# Patient Record
Sex: FEMALE | Race: WHITE | NOT HISPANIC OR LATINO | Employment: FULL TIME | URBAN - METROPOLITAN AREA
[De-identification: names, ages, dates, MRNs, and addresses within clinical notes are randomized per-mention and may not be internally consistent; named-entity substitution may affect disease eponyms.]

---

## 2020-06-22 DIAGNOSIS — Z12.31 SCREENING MAMMOGRAM, ENCOUNTER FOR: Primary | ICD-10-CM

## 2020-06-22 DIAGNOSIS — M54.16 LUMBAR RADICULOPATHY: Primary | ICD-10-CM

## 2020-07-10 ENCOUNTER — HOSPITAL ENCOUNTER (OUTPATIENT)
Dept: RADIOLOGY | Facility: HOSPITAL | Age: 60
Discharge: HOME | End: 2020-07-10
Attending: NURSE PRACTITIONER
Payer: OTHER GOVERNMENT

## 2020-07-10 DIAGNOSIS — M54.16 LUMBAR RADICULOPATHY: ICD-10-CM

## 2020-07-10 PROCEDURE — 72148 MRI LUMBAR SPINE WITHOUT CONTRAST: ICD-10-PCS | Mod: 26,,, | Performed by: RADIOLOGY

## 2020-07-10 PROCEDURE — 72148 MRI LUMBAR SPINE W/O DYE: CPT | Mod: 26,,, | Performed by: RADIOLOGY

## 2020-07-10 PROCEDURE — 72148 MRI LUMBAR SPINE W/O DYE: CPT | Mod: TC

## 2020-08-04 ENCOUNTER — OFFICE VISIT (OUTPATIENT)
Dept: PAIN MEDICINE | Facility: CLINIC | Age: 60
End: 2020-08-04
Payer: OTHER GOVERNMENT

## 2020-08-04 VITALS
DIASTOLIC BLOOD PRESSURE: 76 MMHG | WEIGHT: 172 LBS | HEART RATE: 115 BPM | HEIGHT: 65 IN | SYSTOLIC BLOOD PRESSURE: 140 MMHG | BODY MASS INDEX: 28.66 KG/M2

## 2020-08-04 DIAGNOSIS — M70.62 GREATER TROCHANTERIC BURSITIS OF LEFT HIP: Primary | ICD-10-CM

## 2020-08-04 DIAGNOSIS — M54.16 RADICULOPATHY, LUMBAR REGION: ICD-10-CM

## 2020-08-04 DIAGNOSIS — Z01.818 PRE-OP TESTING: Primary | ICD-10-CM

## 2020-08-04 DIAGNOSIS — M51.36 DDD (DEGENERATIVE DISC DISEASE), LUMBAR: ICD-10-CM

## 2020-08-04 DIAGNOSIS — M47.896 OTHER SPONDYLOSIS, LUMBAR REGION: ICD-10-CM

## 2020-08-04 PROCEDURE — 99204 PR OFFICE/OUTPT VISIT, NEW, LEVL IV, 45-59 MIN: ICD-10-PCS | Mod: 25,S$PBB,, | Performed by: ANESTHESIOLOGY

## 2020-08-04 PROCEDURE — 20610 LARGE JOINT ASPIRATION/INJECTION: L GREATER TROCHANTERIC BURSA: ICD-10-PCS | Mod: S$PBB,LT,, | Performed by: ANESTHESIOLOGY

## 2020-08-04 PROCEDURE — 99213 OFFICE O/P EST LOW 20 MIN: CPT | Mod: PBBFAC,PN,25 | Performed by: ANESTHESIOLOGY

## 2020-08-04 PROCEDURE — 99999 PR PBB SHADOW E&M-EST. PATIENT-LVL III: CPT | Mod: PBBFAC,,, | Performed by: ANESTHESIOLOGY

## 2020-08-04 PROCEDURE — 20610 DRAIN/INJ JOINT/BURSA W/O US: CPT | Mod: PBBFAC,PN | Performed by: ANESTHESIOLOGY

## 2020-08-04 PROCEDURE — 99999 PR PBB SHADOW E&M-EST. PATIENT-LVL III: ICD-10-PCS | Mod: PBBFAC,,, | Performed by: ANESTHESIOLOGY

## 2020-08-04 PROCEDURE — 99204 OFFICE O/P NEW MOD 45 MIN: CPT | Mod: 25,S$PBB,, | Performed by: ANESTHESIOLOGY

## 2020-08-04 RX ORDER — METHYLPREDNISOLONE ACETATE 40 MG/ML
40 INJECTION, SUSPENSION INTRA-ARTICULAR; INTRALESIONAL; INTRAMUSCULAR; SOFT TISSUE
Status: DISCONTINUED | OUTPATIENT
Start: 2020-08-04 | End: 2020-08-04 | Stop reason: HOSPADM

## 2020-08-04 RX ORDER — AMITRIPTYLINE HYDROCHLORIDE 25 MG/1
TABLET, FILM COATED ORAL
COMMUNITY
Start: 2020-06-16 | End: 2020-08-11

## 2020-08-04 RX ORDER — PREDNISONE 10 MG/1
TABLET ORAL
COMMUNITY
Start: 2020-06-16 | End: 2021-03-22

## 2020-08-04 RX ORDER — CETIRIZINE HYDROCHLORIDE AND PSEUDOEPHEDRINE HYDROCHLORIDE 5; 120 MG/1; MG/1
1 TABLET, FILM COATED, EXTENDED RELEASE ORAL EVERY 12 HOURS PRN
COMMUNITY
Start: 2020-06-16

## 2020-08-04 RX ORDER — DICLOFENAC SODIUM 75 MG/1
75 TABLET, DELAYED RELEASE ORAL 2 TIMES DAILY
Qty: 60 TABLET | Refills: 2 | Status: SHIPPED | OUTPATIENT
Start: 2020-08-04 | End: 2021-03-22

## 2020-08-04 RX ADMIN — METHYLPREDNISOLONE ACETATE 40 MG: 40 INJECTION, SUSPENSION INTRA-ARTICULAR; INTRALESIONAL; INTRAMUSCULAR; SOFT TISSUE at 02:08

## 2020-08-04 NOTE — PROCEDURES
Large Joint Aspiration/Injection: L greater trochanteric bursa    Date/Time: 8/4/2020 2:00 PM  Performed by: Luan Russell MD  Authorized by: Luan Russell MD     Consent Done?:  Yes (Written)  Indications:  Pain  Site marked: the procedure site was marked    Timeout: prior to procedure the correct patient, procedure, and site was verified    Prep: patient was prepped and draped in usual sterile fashion      Local anesthesia used?: Yes (cold spray used for topicalization)    Local anesthetic:  Bupivacaine 0.25% without epinephrine    Details:  Needle Size:  25 G  Ultrasonic Guidance for needle placement?: No    Approach:  Lateral  Location:  Hip  Site:  L greater trochanteric bursa  Medications:  40 mg methylPREDNISolone acetate 40 mg/mL  Patient tolerance:  Patient tolerated the procedure well with no immediate complications     40 mg of depo-medrol was diluted with 4 mL of 0.25% bupivacaine

## 2020-08-04 NOTE — H&P (VIEW-ONLY)
Referring Physician: Felipe Wolf    PCP: Tanner Denson NP    CC: low back pain and left hip pain    HPI:   Michelle Miller is a 60 y.o. female with PMH significant for COPD and active tobacco use presents as a referral for the evaluation of low back and left hip pain. The patient reports that her pain began approximately in 1/2018 after no inciting incident or trauma. The patient localizes her pain at the level of her bra strap and lower (L>R). The patient reports of intermittent left hip pain. The patient also reports that her pain can radiate down her LLE to her ankle when she sits for prolonged periods of time. The patient reports of intermittent numbness and tingling in her LLE when she drives for prolonged periods of time. The patient describes her pain as a sharp type of pain. The patient reports that her current pain 3-4/10. Patient denies of any urinary/fecal incontinence, saddle anesthesia, or weakness.     Of note, the patient does report of left sided hip pain which was not present when she was being treated by Dr. Mares.     Aggravating factors: walking, laying on her left side, prolonged sitting, bending    Mitigating factors: loratab as needed (but requires phenergan for nausea)     Relevant Surgeries: no    Interventional Therapies: yes; Dr. Mares  12/21/2018: Left L4-L5 transforaminal HILARIO - 1 year of benefit     : Not applicable      Non-pharmacologic Treatment:     · Physical Therapy: yes; last did in 2017; no benefit   · Ice/Heat: yes; alternates heat and ice without much benefit   · TENS: no  · Massage: yes; tried at the chiropractor with no benefit   · Chiropractic care: yes; no benefit   · Acupuncture: no         Pain Medications:         · Currently taking: Loratab  mg (half a tablet) PO q day; ibuprofen (some benefit)    · Has tried in the past:    · Opioids: yes  · NSAIDS: yes; meloxicam - could not tolerate side effects  · Tylenol: yes; some benefit   · Muscle relaxants: yes;  cannot tolerate side effects   · TCAs: yes; stopped elavil secondary to no benefit   · SNRIs: no  · Anticonvulsants: no  · topical creams: yes; OTC creams with no benefit   · Other: prednisone PRN is helpful for back pain    Anticoagulation: no    ROS:  Review of Systems   Constitutional: Negative for chills and fever.   HENT: Negative for sore throat.    Eyes: Negative for visual disturbance.   Respiratory: Negative for shortness of breath.    Cardiovascular: Negative for chest pain.   Gastrointestinal: Negative for nausea and vomiting.   Genitourinary: Negative for difficulty urinating.   Musculoskeletal: Positive for arthralgias and back pain.   Skin: Negative for rash.   Allergic/Immunologic: Negative for immunocompromised state.   Neurological: Negative for syncope.   Hematological: Does not bruise/bleed easily.   Psychiatric/Behavioral: Negative for suicidal ideas.        Past Medical History:   Diagnosis Date    Arthritis     Asthma     Osteoporosis      Past Surgical History:   Procedure Laterality Date    CHOLECYSTECTOMY      HILARIO  12/21/2018    FRACTURE SURGERY      HYSTERECTOMY       History reviewed. No pertinent family history.  Social History     Socioeconomic History    Marital status: Single     Spouse name: Not on file    Number of children: Not on file    Years of education: Not on file    Highest education level: Not on file   Occupational History    Not on file   Social Needs    Financial resource strain: Not on file    Food insecurity     Worry: Not on file     Inability: Not on file    Transportation needs     Medical: Not on file     Non-medical: Not on file   Tobacco Use    Smoking status: Current Every Day Smoker    Smokeless tobacco: Current User   Substance and Sexual Activity    Alcohol use: Yes     Frequency: Monthly or less     Drinks per session: 1 or 2     Binge frequency: Less than monthly    Drug use: Never    Sexual activity: Not Currently   Lifestyle     "Physical activity     Days per week: Not on file     Minutes per session: Not on file    Stress: Not on file   Relationships    Social connections     Talks on phone: Not on file     Gets together: Not on file     Attends Restoration[employer] service: Not on file     Active member of club or organization: Not on file     Attends meetings of clubs or organizations: Not on file     Relationship status: Not on file   Other Topics Concern    Not on file   Social History Narrative    Not on file         Allergies: See med card    Vitals:    08/04/20 1322   BP: (!) 140/76   Pulse: (!) 115   Weight: 78 kg (172 lb)   Height: 5' 5" (1.651 m)   PainSc:   2   PainLoc: Back         Physical exam:  Physical Exam   Constitutional: She is oriented to person, place, and time and well-developed, well-nourished, and in no distress.   HENT:   Head: Normocephalic and atraumatic.   Eyes: Conjunctivae and EOM are normal. Right eye exhibits no discharge. Left eye exhibits no discharge.   Cardiovascular: Normal rate.   Pulmonary/Chest: Effort normal and breath sounds normal. No respiratory distress.   Abdominal: Soft.   Neurological: She is alert and oriented to person, place, and time.   Skin: Skin is warm and dry. No rash noted. She is not diaphoretic.   Psychiatric: Mood, memory, affect and judgment normal.   Nursing note and vitals reviewed.       UPPER EXTREMITIES: Normal alignment, normal range of motion, no atrophy, no skin changes,  hair growth and nail growth normal and equal bilaterally. No swelling, no tenderness.    LOWER EXTREMITIES:  Normal alignment, normal range of motion, no atrophy, no skin changes,  hair growth and nail growth normal and equal bilaterally. No swelling, no tenderness.    LUMBAR SPINE  Lumbar spine: ROM is limited with flexion extension and oblique extension with increased pain with extension.     ((--)) Supine straight leg raise    ((+)) Facet loading   Internal and external rotation of the hip causes no " increased pain on either side. TTP at the site of the left greater trochanter.   Myofascial exam: Tenderness to palpation across lumbar paraspinous muscles.    ((--)) TTP at the SI joint  ((--)) ARGELIA's test (positive for hip pain bilaterally)  ((--)) One leg stand    ((--)) Distraction test    CRANIAL NERVES:  II:  PERRL bilaterally,   III,IV,VI: EOMI.    V:  Facial sensation equal bilaterally  VII:  Facial motor function normal.  VIII:  Hearing equal to finger rub bilaterally  IX/X: Gag normal, palate symmetric  XI:  Shoulder shrug equal, head turn equal  XII:  Tongue midline without fasciculations      MOTOR: Tone and bulk: normal bilateral upper and lower Strength: normal   Delt Bi Tri WE WF     R 5 5 5 5 5 5   L 5 5 5 5 5 5     IP ADD ABD Quad TA Gas HAM  R 5 5 5 5 5 5 5  L 5 5 5 5 5 5 5    SENSATION: Light touch and pinprick intact bilaterally  REFLEXES: normal, symmetric, nonbrisk.  Toes down, no clonus. Negative zarate's sign bilaterally.  GAIT: normal rise, base, steps, and arm swing.        Imaging:          MRI Lumbar spine without contrast (7/10/2020):  Vertebral body height and alignment are anatomic.  Marrow signal is within normal limits.  The conus terminates at L1.  Disc heights are well maintained.  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     L1-2: There is no disc herniation.  There is mild bilateral neuroforaminal narrowing predominately due to short pedicles.     L2-3: There is no disc herniation.  Mild spinal canal narrowing and mild bilateral neuroforaminal narrowing are present due to short pedicles and mild facet arthropathy.     L3-4: There is moderate bilateral neuroforaminal narrowing due to mild disc bulging, short pedicles, and mild facet arthropathy.  Mild spinal canal narrowing is also present.     L4-5: There is moderate bilateral neuroforaminal narrowing related to mild disc bulging, short pedicles, and mild facet arthropathy.  There is also mild spinal canal  narrowing.     L5-S1: No disc herniation, spinal canal narrowing, or neuroforaminal narrowing.     There is a 2.8 cm fusiform aneurysm of the infrarenal aorta.     Impression:     Mild spinal canal narrowing and neuroforaminal narrowing ranging from mild to moderate, with congenitally short pedicles contributory.     Small infrarenal abdominal aortic aneurysm.    Assessment: Michelle Miller is a 60 y.o. female with PMH significant for COPD and active tobacco use presents as a referral for the evaluation of low back and left hip pain. Physical examination significant for reproducible TTP at the site of the left greater trochanter and pain with facet loading and extension of the lumbar spine. MRI reviewed with the patient personally. MRI significant for DDD and facet arthropathy. Treatment plan outlined below.     Plan:  - Schedule for L4-L5 lumbar interlaminar epidural steroid injection approximately 2 weeks from now in [place] for back pain  - Performed left greater trochanteric bursa injection today in clinic   - Prescribe diclofenac 75 mg PO q 12 hr PRN for inflammation and pain. Instructed the patient to discontinue her OTC ibuprofen while taking diclofenac.   - I have stressed the importance of physical activity and a home exercise plan to help with chronic pain and improve health.  - RTC in 2 weeks for the procedure as outlined above     Thank you for referring this interesting patient, and I look forward to continuing to collaborate in her care.    Luan Russell MD  Pain Management

## 2020-08-04 NOTE — PROGRESS NOTES
Referring Physician: Felipe Wolf    PCP: Tanner Denson NP    CC: low back pain and left hip pain    HPI:   Michelle Miller is a 60 y.o. female with PMH significant for COPD and active tobacco use presents as a referral for the evaluation of low back and left hip pain. The patient reports that her pain began approximately in 1/2018 after no inciting incident or trauma. The patient localizes her pain at the level of her bra strap and lower (L>R). The patient reports of intermittent left hip pain. The patient also reports that her pain can radiate down her LLE to her ankle when she sits for prolonged periods of time. The patient reports of intermittent numbness and tingling in her LLE when she drives for prolonged periods of time. The patient describes her pain as a sharp type of pain. The patient reports that her current pain 3-4/10. Patient denies of any urinary/fecal incontinence, saddle anesthesia, or weakness.     Of note, the patient does report of left sided hip pain which was not present when she was being treated by Dr. Mares.     Aggravating factors: walking, laying on her left side, prolonged sitting, bending    Mitigating factors: loratab as needed (but requires phenergan for nausea)     Relevant Surgeries: no    Interventional Therapies: yes; Dr. Mares  12/21/2018: Left L4-L5 transforaminal IHLARIO - 1 year of benefit     : Not applicable      Non-pharmacologic Treatment:     · Physical Therapy: yes; last did in 2017; no benefit   · Ice/Heat: yes; alternates heat and ice without much benefit   · TENS: no  · Massage: yes; tried at the chiropractor with no benefit   · Chiropractic care: yes; no benefit   · Acupuncture: no         Pain Medications:         · Currently taking: Loratab  mg (half a tablet) PO q day; ibuprofen (some benefit)    · Has tried in the past:    · Opioids: yes  · NSAIDS: yes; meloxicam - could not tolerate side effects  · Tylenol: yes; some benefit   · Muscle relaxants: yes;  cannot tolerate side effects   · TCAs: yes; stopped elavil secondary to no benefit   · SNRIs: no  · Anticonvulsants: no  · topical creams: yes; OTC creams with no benefit   · Other: prednisone PRN is helpful for back pain    Anticoagulation: no    ROS:  Review of Systems   Constitutional: Negative for chills and fever.   HENT: Negative for sore throat.    Eyes: Negative for visual disturbance.   Respiratory: Negative for shortness of breath.    Cardiovascular: Negative for chest pain.   Gastrointestinal: Negative for nausea and vomiting.   Genitourinary: Negative for difficulty urinating.   Musculoskeletal: Positive for arthralgias and back pain.   Skin: Negative for rash.   Allergic/Immunologic: Negative for immunocompromised state.   Neurological: Negative for syncope.   Hematological: Does not bruise/bleed easily.   Psychiatric/Behavioral: Negative for suicidal ideas.        Past Medical History:   Diagnosis Date    Arthritis     Asthma     Osteoporosis      Past Surgical History:   Procedure Laterality Date    CHOLECYSTECTOMY      HILARIO  12/21/2018    FRACTURE SURGERY      HYSTERECTOMY       History reviewed. No pertinent family history.  Social History     Socioeconomic History    Marital status: Single     Spouse name: Not on file    Number of children: Not on file    Years of education: Not on file    Highest education level: Not on file   Occupational History    Not on file   Social Needs    Financial resource strain: Not on file    Food insecurity     Worry: Not on file     Inability: Not on file    Transportation needs     Medical: Not on file     Non-medical: Not on file   Tobacco Use    Smoking status: Current Every Day Smoker    Smokeless tobacco: Current User   Substance and Sexual Activity    Alcohol use: Yes     Frequency: Monthly or less     Drinks per session: 1 or 2     Binge frequency: Less than monthly    Drug use: Never    Sexual activity: Not Currently   Lifestyle     "Physical activity     Days per week: Not on file     Minutes per session: Not on file    Stress: Not on file   Relationships    Social connections     Talks on phone: Not on file     Gets together: Not on file     Attends [employer] service: Not on file     Active member of club or organization: Not on file     Attends meetings of clubs or organizations: Not on file     Relationship status: Not on file   Other Topics Concern    Not on file   Social History Narrative    Not on file         Allergies: See med card    Vitals:    08/04/20 1322   BP: (!) 140/76   Pulse: (!) 115   Weight: 78 kg (172 lb)   Height: 5' 5" (1.651 m)   PainSc:   2   PainLoc: Back         Physical exam:  Physical Exam   Constitutional: She is oriented to person, place, and time and well-developed, well-nourished, and in no distress.   HENT:   Head: Normocephalic and atraumatic.   Eyes: Conjunctivae and EOM are normal. Right eye exhibits no discharge. Left eye exhibits no discharge.   Cardiovascular: Normal rate.   Pulmonary/Chest: Effort normal and breath sounds normal. No respiratory distress.   Abdominal: Soft.   Neurological: She is alert and oriented to person, place, and time.   Skin: Skin is warm and dry. No rash noted. She is not diaphoretic.   Psychiatric: Mood, memory, affect and judgment normal.   Nursing note and vitals reviewed.       UPPER EXTREMITIES: Normal alignment, normal range of motion, no atrophy, no skin changes,  hair growth and nail growth normal and equal bilaterally. No swelling, no tenderness.    LOWER EXTREMITIES:  Normal alignment, normal range of motion, no atrophy, no skin changes,  hair growth and nail growth normal and equal bilaterally. No swelling, no tenderness.    LUMBAR SPINE  Lumbar spine: ROM is limited with flexion extension and oblique extension with increased pain with extension.     ((--)) Supine straight leg raise    ((+)) Facet loading   Internal and external rotation of the hip causes no " increased pain on either side. TTP at the site of the left greater trochanter.   Myofascial exam: Tenderness to palpation across lumbar paraspinous muscles.    ((--)) TTP at the SI joint  ((--)) ARGELIA's test (positive for hip pain bilaterally)  ((--)) One leg stand    ((--)) Distraction test    CRANIAL NERVES:  II:  PERRL bilaterally,   III,IV,VI: EOMI.    V:  Facial sensation equal bilaterally  VII:  Facial motor function normal.  VIII:  Hearing equal to finger rub bilaterally  IX/X: Gag normal, palate symmetric  XI:  Shoulder shrug equal, head turn equal  XII:  Tongue midline without fasciculations      MOTOR: Tone and bulk: normal bilateral upper and lower Strength: normal   Delt Bi Tri WE WF     R 5 5 5 5 5 5   L 5 5 5 5 5 5     IP ADD ABD Quad TA Gas HAM  R 5 5 5 5 5 5 5  L 5 5 5 5 5 5 5    SENSATION: Light touch and pinprick intact bilaterally  REFLEXES: normal, symmetric, nonbrisk.  Toes down, no clonus. Negative zarate's sign bilaterally.  GAIT: normal rise, base, steps, and arm swing.        Imaging:          MRI Lumbar spine without contrast (7/10/2020):  Vertebral body height and alignment are anatomic.  Marrow signal is within normal limits.  The conus terminates at L1.  Disc heights are well maintained.  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     L1-2: There is no disc herniation.  There is mild bilateral neuroforaminal narrowing predominately due to short pedicles.     L2-3: There is no disc herniation.  Mild spinal canal narrowing and mild bilateral neuroforaminal narrowing are present due to short pedicles and mild facet arthropathy.     L3-4: There is moderate bilateral neuroforaminal narrowing due to mild disc bulging, short pedicles, and mild facet arthropathy.  Mild spinal canal narrowing is also present.     L4-5: There is moderate bilateral neuroforaminal narrowing related to mild disc bulging, short pedicles, and mild facet arthropathy.  There is also mild spinal canal  narrowing.     L5-S1: No disc herniation, spinal canal narrowing, or neuroforaminal narrowing.     There is a 2.8 cm fusiform aneurysm of the infrarenal aorta.     Impression:     Mild spinal canal narrowing and neuroforaminal narrowing ranging from mild to moderate, with congenitally short pedicles contributory.     Small infrarenal abdominal aortic aneurysm.    Assessment: Michelle Miller is a 60 y.o. female with PMH significant for COPD and active tobacco use presents as a referral for the evaluation of low back and left hip pain. Physical examination significant for reproducible TTP at the site of the left greater trochanter and pain with facet loading and extension of the lumbar spine. MRI reviewed with the patient personally. MRI significant for DDD and facet arthropathy. Treatment plan outlined below.     Plan:  - Schedule for L4-L5 lumbar interlaminar epidural steroid injection approximately 2 weeks from now in [place] for back pain  - Performed left greater trochanteric bursa injection today in clinic   - Prescribe diclofenac 75 mg PO q 12 hr PRN for inflammation and pain. Instructed the patient to discontinue her OTC ibuprofen while taking diclofenac.   - I have stressed the importance of physical activity and a home exercise plan to help with chronic pain and improve health.  - RTC in 2 weeks for the procedure as outlined above     Thank you for referring this interesting patient, and I look forward to continuing to collaborate in her care.    Luan Russell MD  Pain Management

## 2020-08-06 ENCOUNTER — TELEPHONE (OUTPATIENT)
Dept: PAIN MEDICINE | Facility: CLINIC | Age: 60
End: 2020-08-06

## 2020-08-06 NOTE — TELEPHONE ENCOUNTER
----- Message from Rosalva Gleason sent at 8/5/2020  8:53 AM CDT -----  Contact: self  Type: Needs Medical Advice  Who Called:  patient     Best Call Back Number: [phone]  Additional Information: patient has questions regarding her procedure and date please contact to advise.

## 2020-08-06 NOTE — TELEPHONE ENCOUNTER
Left msg on machine   Patient's wife notified [facility] ready to be picked up.  Informed her that it had been received by pharmacy 6-26-19.  She v/[address]

## 2020-08-11 ENCOUNTER — OFFICE VISIT (OUTPATIENT)
Dept: ENDOCRINOLOGY | Facility: CLINIC | Age: 60
End: 2020-08-11
Payer: OTHER GOVERNMENT

## 2020-08-11 VITALS
TEMPERATURE: 98 F | HEIGHT: 65 IN | BODY MASS INDEX: 28.14 KG/M2 | DIASTOLIC BLOOD PRESSURE: 72 MMHG | WEIGHT: 168.88 LBS | HEART RATE: 97 BPM | SYSTOLIC BLOOD PRESSURE: 132 MMHG

## 2020-08-11 DIAGNOSIS — E55.9 VITAMIN D INSUFFICIENCY: ICD-10-CM

## 2020-08-11 DIAGNOSIS — M85.80 OSTEOPENIA, UNSPECIFIED LOCATION: ICD-10-CM

## 2020-08-11 DIAGNOSIS — E05.90 HYPERTHYROIDISM: Primary | ICD-10-CM

## 2020-08-11 PROCEDURE — 99999 PR PBB SHADOW E&M-EST. PATIENT-LVL III: CPT | Mod: PBBFAC,,, | Performed by: INTERNAL MEDICINE

## 2020-08-11 PROCEDURE — 99204 PR OFFICE/OUTPT VISIT, NEW, LEVL IV, 45-59 MIN: ICD-10-PCS | Mod: S$PBB,,, | Performed by: INTERNAL MEDICINE

## 2020-08-11 PROCEDURE — 99204 OFFICE O/P NEW MOD 45 MIN: CPT | Mod: S$PBB,,, | Performed by: INTERNAL MEDICINE

## 2020-08-11 PROCEDURE — 99999 PR PBB SHADOW E&M-EST. PATIENT-LVL III: ICD-10-PCS | Mod: PBBFAC,,, | Performed by: INTERNAL MEDICINE

## 2020-08-11 PROCEDURE — 99213 OFFICE O/P EST LOW 20 MIN: CPT | Mod: PBBFAC,PO | Performed by: INTERNAL MEDICINE

## 2020-08-11 RX ORDER — METHIMAZOLE 10 MG/1
20 TABLET ORAL DAILY
Qty: 60 TABLET | Refills: 5 | Status: SHIPPED | OUTPATIENT
Start: 2020-08-11 | End: 2020-09-22

## 2020-08-11 NOTE — PROGRESS NOTES
Subjective:      Chief Complaint: Hyperthyroidism    HPI: Michelle Miller is a 60 y.o. female who is here for an initial evaluation for thyroid.    For the hyperthyroidism:  6/2020: TSH <0.005    Last labs 7/6/2020:   TSH <0.005   Free T4 2.43 (high)   Total T3 268 (high)    Did have some low TSH in the past, 7/2019 and 4/2019 was too low to measure. Was prescribed levothyroxine based on those results, not taking currently.  Prior 12/2016 and 3/2015 was normal TSH.    Pt also reports having a hysterectomy 2005. Not on hormones.    Also notes hx osteopenia. Toe fracture, no other broken bones. No falls.  Had some low vit D.    Pt reports fatigue, sweats, irritability, hair thinning. Sometimes diarrhea, sometimes constipation. Feeling hot a lot. Tremor, insomnia. Palpitations. Anxiety. Lost a few lbs. No vision symptoms.    Reviewed past medical, family, social history and updated as appropriate.    Review of Systems   Constitutional: Positive for fatigue and unexpected weight change (losing some weight).   HENT: Negative for trouble swallowing.    Eyes: Negative for visual disturbance.   Respiratory: Negative for shortness of breath.    Cardiovascular: Positive for palpitations.   Gastrointestinal: Positive for constipation and diarrhea.   Endocrine: Positive for heat intolerance. Negative for cold intolerance.   Genitourinary: Negative for frequency.   Musculoskeletal: Positive for back pain.   Skin:        Hair thinning   Neurological: Positive for tremors.   Psychiatric/Behavioral: Positive for agitation and sleep disturbance.        Anxiety     Objective:     Vitals:    08/11/20 1455   BP: 132/72   Pulse: 97   Temp: 98.1 °F (36.7 °C)     BP Readings from Last 5 Encounters:   08/11/20 132/72   08/04/20 (!) 140/76     Physical Exam  Vitals signs reviewed.   Constitutional:       Appearance: She is well-developed.   HENT:      Head: Normocephalic and atraumatic.   Neck:      Musculoskeletal: Normal range of motion and  neck supple.      Thyroid: No thyromegaly.      Comments: No bruit  Cardiovascular:      Rate and Rhythm: Normal rate and regular rhythm.      Heart sounds: No murmur.   Pulmonary:      Effort: Pulmonary effort is normal.      Breath sounds: Normal breath sounds.   Abdominal:      General: There is no distension.      Palpations: Abdomen is soft. There is no mass.      Tenderness: There is no abdominal tenderness.   Musculoskeletal: Normal range of motion.         General: No tenderness.   Neurological:      Mental Status: She is alert and oriented to person, place, and time.      Motor: Tremor (fine) present.   Psychiatric:         Judgment: Judgment normal.       Wt Readings from Last 5 Encounters:   08/11/20 1455 76.6 kg (168 lb 14 oz)   08/04/20 1322 78 kg (172 lb)     Labs in HPI, to be scanned into media.    Assessment/Plan:     Hyperthyroidism  Differential diagnosis includes Graves disease, MNG/toxic nodule, thyroiditis.   - less likely transient thyroiditis given low TSH last year as well. Was prescribed levothyroxine for a low TSH, encourage pt not to take that. Hasn't been taking it for the last year or so.   - clinically hyperthyroid   - will start medication. Methimazole 20 mg/day. Discussed with pt potential for remission after 1.5 years of therapy   - recheck labs in 6 weeks along with antibodies   consider NM uptake and scan if antibodies negative  Cause-specific treatment options and associated risks discussed with patient.    - Reviewed possible long-term options including anti-thyroid drugs (like methimazole), I-131 therapy, and surgery.  Handout provided. All questions were answered.      Osteopenia  Pt reports hx osteopenia   - now also hyperthyroid. Ensure euthyroid as above   - will check BMD in a month or two if possible   - encourage pt to avoid falls      Vitamin D insufficiency  Had low vit D several times   - was on treatment   - lately not really taking anything   - recheck with next  labs, may need to take more vitamin D   - ideally at least 1,000 units/day for now        Follow up in about 5 months (around 1/11/2021) for lab review, further monitoring.      Nav Montgomery MD  Endocrinology

## 2020-08-11 NOTE — PATIENT INSTRUCTIONS
For the thyroid:   Start methimazole 20 mg once a day.   Recheck blood test in 6 weeks (mid/late September) to make sure thyroid is getting back to normal and investigate the cause (Graves disease vs something else).       Hyperthyroidism    You have hyperthyroidism. This means you have a thyroid gland that makes too much thyroid hormone. This hormone is vital to body growth and metabolism. If you make too much thyroid hormone, many body processes speed up and may not work right. This can cause symptoms throughout the body.  There are a number of causes of hyperthyroidism. The most common cause is Graves disease. This occurs when the bodys immune system causes the thyroid to grow and make more thyroid hormone than needed.  Symptoms of hyperthyroidism include:  · Nervousness, anxiety, irritability  · Shaking (tremors) that affects the hands and fingers  · Weight loss despite having a normal or increased appetite  · Low tolerance to heat  · Sweating more than normal  · Fast or irregular heartbeat  · Lighter or irregular periods (women only)  · More frequent bowel movements  · Enlarged thyroid gland (goiter)  · Bulging eyes  · Problems sleeping  · Muscle weakness  · Fatigue  · Swelling of the hands, ankles, or feet (older adults only)  Treatment for hyperthyroidism may include taking medicines. For instance, antithyroid medicines may be prescribed. These help lower the amount of thyroid hormone made by the thyroid gland. Beta-blockers may be prescribed as well. Tips for taking medicines are given below.  Radioiodine ablation or surgery may also be advised. Your healthcare provider will tell you more about these options if needed.  Home care  Tips for taking medicines  · Take any medicines youre prescribed as directed.  · Take your medicine at the same times each day.  · Use a pillbox labeled with the days of the week. This will help you remember to take your medicine each day.  · Tell your provider if you have any  side effects from your medicines that bother you.  · Never stop taking medicines on your own. If you do, your symptoms will return.  General care  · Always talk with your provider before trying other medicines or treatments for your thyroid problem.  · If you see other healthcare providers, be sure to let them know about your thyroid problem.  Follow-up care  See your healthcare provider for checkups as advised. You may need regular tests to check the level of thyroid hormone in your blood.  When to seek medical advice  Call your healthcare provider right away if any of these occur:  · New symptoms occur  · Symptoms return, continue, or worsen even after treatment  · Extreme fatigue  · Puffy hands, face, or feet  · Confusion  Call 911  Call 911 right away if any of these occur:  · Fainting  · Chest pain  · Shortness of breath or trouble breathing  Date Last Reviewed: 8/24/2015  © 9076-3981 [employer]. [address] [place] [address], [place] [address]. All rights reserved. This information is not intended as a substitute for professional medical care. Always follow your healthcare professional's instructions.

## 2020-08-24 ENCOUNTER — LAB VISIT (OUTPATIENT)
Dept: FAMILY MEDICINE | Facility: CLINIC | Age: 60
End: 2020-08-24
Payer: OTHER GOVERNMENT

## 2020-08-24 DIAGNOSIS — Z01.818 PRE-OP TESTING: ICD-10-CM

## 2020-08-24 PROCEDURE — U0003 INFECTIOUS AGENT DETECTION BY NUCLEIC ACID (DNA OR RNA); SEVERE ACUTE RESPIRATORY SYNDROME CORONAVIRUS 2 (SARS-COV-2) (CORONAVIRUS DISEASE [COVID-19]), AMPLIFIED PROBE TECHNIQUE, MAKING USE OF HIGH THROUGHPUT TECHNOLOGIES AS DESCRIBED BY CMS-2020-01-R: HCPCS

## 2020-08-25 LAB — SARS-COV-2 RNA RESP QL NAA+PROBE: NOT DETECTED

## 2020-08-27 ENCOUNTER — HOSPITAL ENCOUNTER (OUTPATIENT)
Facility: HOSPITAL | Age: 60
Discharge: HOME | End: 2020-08-27
Attending: ANESTHESIOLOGY | Admitting: ANESTHESIOLOGY

## 2020-08-27 VITALS
OXYGEN SATURATION: 97 % | BODY MASS INDEX: 28.32 KG/M2 | SYSTOLIC BLOOD PRESSURE: 136 MMHG | TEMPERATURE: 98 F | HEIGHT: 65 IN | DIASTOLIC BLOOD PRESSURE: 76 MMHG | RESPIRATION RATE: 16 BRPM | WEIGHT: 170 LBS | HEART RATE: 88 BPM

## 2020-08-27 DIAGNOSIS — M51.36 DEGENERATIVE DISC DISEASE, LUMBAR: Primary | ICD-10-CM

## 2020-08-27 PROBLEM — M51.369 DEGENERATIVE DISC DISEASE, LUMBAR: Status: ACTIVE | Noted: 2020-08-27

## 2020-08-27 PROCEDURE — 62323 NJX INTERLAMINAR LMBR/SAC: CPT | Mod: ,,, | Performed by: ANESTHESIOLOGY

## 2020-08-27 PROCEDURE — 62323 PR INJ LUMBAR/SACRAL, W/IMAGING GUIDANCE: ICD-10-PCS | Mod: ,,, | Performed by: ANESTHESIOLOGY

## 2020-08-27 PROCEDURE — 25500020 PHARM REV CODE 255: Performed by: ANESTHESIOLOGY

## 2020-08-27 PROCEDURE — 62323 NJX INTERLAMINAR LMBR/SAC: CPT | Performed by: ANESTHESIOLOGY

## 2020-08-27 PROCEDURE — 25000003 PHARM REV CODE 250: Performed by: ANESTHESIOLOGY

## 2020-08-27 PROCEDURE — 99152 MOD SED SAME PHYS/QHP 5/>YRS: CPT | Performed by: ANESTHESIOLOGY

## 2020-08-27 PROCEDURE — 63600175 PHARM REV CODE 636 W HCPCS: Performed by: ANESTHESIOLOGY

## 2020-08-27 RX ORDER — MIDAZOLAM HYDROCHLORIDE 5 MG/ML
INJECTION INTRAMUSCULAR; INTRAVENOUS
Status: DISCONTINUED | OUTPATIENT
Start: 2020-08-27 | End: 2020-08-27 | Stop reason: HOSPADM

## 2020-08-27 RX ORDER — LIDOCAINE HYDROCHLORIDE 10 MG/ML
INJECTION INFILTRATION; PERINEURAL
Status: DISCONTINUED
Start: 2020-08-27 | End: 2020-08-27 | Stop reason: HOSPADM

## 2020-08-27 RX ORDER — SODIUM CHLORIDE, SODIUM LACTATE, POTASSIUM CHLORIDE, CALCIUM CHLORIDE 600; 310; 30; 20 MG/100ML; MG/100ML; MG/100ML; MG/100ML
INJECTION, SOLUTION INTRAVENOUS ONCE AS NEEDED
Status: DISCONTINUED | OUTPATIENT
Start: 2020-08-27 | End: 2020-08-27 | Stop reason: HOSPADM

## 2020-08-27 RX ORDER — METHYLPREDNISOLONE ACETATE 80 MG/ML
INJECTION, SUSPENSION INTRA-ARTICULAR; INTRALESIONAL; INTRAMUSCULAR; SOFT TISSUE
Status: DISCONTINUED | OUTPATIENT
Start: 2020-08-27 | End: 2020-08-27 | Stop reason: HOSPADM

## 2020-08-27 RX ORDER — FENTANYL CITRATE 50 UG/ML
INJECTION, SOLUTION INTRAMUSCULAR; INTRAVENOUS
Status: DISCONTINUED | OUTPATIENT
Start: 2020-08-27 | End: 2020-08-27 | Stop reason: HOSPADM

## 2020-08-27 RX ORDER — LIDOCAINE HYDROCHLORIDE 10 MG/ML
INJECTION INFILTRATION; PERINEURAL
Status: DISCONTINUED | OUTPATIENT
Start: 2020-08-27 | End: 2020-08-27 | Stop reason: HOSPADM

## 2020-08-27 RX ORDER — METHYLPREDNISOLONE ACETATE 80 MG/ML
INJECTION, SUSPENSION INTRA-ARTICULAR; INTRALESIONAL; INTRAMUSCULAR; SOFT TISSUE
Status: DISCONTINUED
Start: 2020-08-27 | End: 2020-08-27 | Stop reason: HOSPADM

## 2020-08-27 NOTE — OP NOTE
PROCEDURE DATE: 8/27/2020    PROCEDURE:  Lumbar interlaminar epidural steroid injection at L4-L5 under fluoroscopic guidance (left paramedian approach).    DIAGNOSIS: LUMBAR DISC DISPLACEMENT WITHOUT MYELOPATHY  POSTOP DIAGNOSIS: SAME    PHYSICIAN: Luan Russell M.D.    MEDICATIONS INJECTED: 80 mg depo-medrol with 4 ml of preservative free NaCl    LOCAL ANESTHETIC INJECTED:    Lidocaine 1% 3 ml total    SEDATION MEDICATIONS: RN IV sedation    ESTIMATED BLOOD LOSS:  none    COMPLICATIONS:  Possible dural leak    TECHNIQUE:  Time-out taken to identify patient and procedure prior to starting the procedure.  With the patient laying in a prone position, the area was prepped and draped in the usual sterile fashion using ChloraPrep and a fenestrated drape.  After determining the target level with an AP fluoroscopic view, local anesthetic was given using a 25-gauge 1.5 inch needle by raising a wheal and then infiltrating toward the interlaminar entry space.  A 3.5 inch 20-gauge Touhy needle was introduced under AP fluoroscopic guidance to the interlaminar space of L4-L5. Once the trajectory was established, the needle was visualized in the lateral view and advanced using loss of resistance technique. Once in the desired position, 2 mL contrast was injected to confirm placement and there was no vascular uptake nor intrathecal spread.  The medication was then injected slowly. The patient tolerated the procedure well.      The patient was monitored after the procedure.   They were given post-procedure and discharge instructions to follow at home.  The patient was discharged in a stable condition.

## 2020-08-27 NOTE — DISCHARGE SUMMARY
[facility]  Discharge Note  Short Stay    Admit Date: 8/27/2020    Discharge Date and Time: 8/27/2020    Attending Physician: Luan Russell MD     Discharge Provider: Luan Russell    Diagnoses:  Active Hospital Problems    Diagnosis  POA    *Degenerative disc disease, lumbar [M51.36]  Yes      Resolved Hospital Problems   No resolved problems to display.       Hospital Course: Lumbar interlaminar epidural steroid injection     Discharged Condition: Good    Final Diagnoses:   Active Hospital Problems    Diagnosis  POA    *Degenerative disc disease, lumbar [M51.36]  Yes      Resolved Hospital Problems   No resolved problems to display.       Disposition: Home or Self Care    Follow up/Patient Instructions:    Medications:  Reconciled Home Medications:      Medication List      CONTINUE taking these medications    diclofenac 75 MG EC tablet  Commonly known as: VOLTAREN  Take 1 tablet (75 mg total) by mouth 2 (two) times daily.     methIMAzole 10 MG Tab  Commonly known as: TAPAZOLE  Take 2 tablets (20 mg total) by mouth once daily.     predniSONE 10 MG tablet  Commonly known as: DELTASONE  TAKE 1 TABLET BY MOUTH TWICE DAILY FOR 7 DAYS THEN TAKE 1 TAB ONCE DAILY FOR 7 DAYS THEN STOP     ZyrTEC-D 5-120 mg Tb12  Generic drug: cetirizine-pseudoephedrine  Take 1 tablet by mouth every 12 (twelve) hours as needed.          Discharge Procedure Orders   Diet general     Call MD for:  temperature >100.4     Call MD for:  persistent nausea and vomiting     Call MD for:  severe uncontrolled pain     Call MD for:  difficulty breathing, headache or visual disturbances     Call MD for:  redness, tenderness, or signs of infection (pain, swelling, redness, odor or green/yellow discharge around incision site)     Call MD for:  hives     Call MD for:  persistent dizziness or light-headedness     Call MD for:  extreme fatigue        Follow up with MD in 2-3 weeks    Discharge Procedure Orders (must include Diet,  Follow-up, Activity):   Discharge Procedure Orders (must include Diet, Follow-up, Activity)   Diet general     Call MD for:  temperature >100.4     Call MD for:  persistent nausea and vomiting     Call MD for:  severe uncontrolled pain     Call MD for:  difficulty breathing, headache or visual disturbances     Call MD for:  redness, tenderness, or signs of infection (pain, swelling, redness, odor or green/yellow discharge around incision site)     Call MD for:  hives     Call MD for:  persistent dizziness or light-headedness     Call MD for:  extreme fatigue

## 2020-09-10 ENCOUNTER — HOSPITAL ENCOUNTER (OUTPATIENT)
Dept: RADIOLOGY | Facility: HOSPITAL | Age: 60
Discharge: HOME | End: 2020-09-10
Attending: NURSE PRACTITIONER
Payer: OTHER GOVERNMENT

## 2020-09-10 VITALS — BODY MASS INDEX: 28.32 KG/M2 | HEIGHT: 65 IN | WEIGHT: 170 LBS

## 2020-09-10 DIAGNOSIS — Z12.31 ENCOUNTER FOR SCREENING MAMMOGRAM FOR MALIGNANT NEOPLASM OF BREAST: ICD-10-CM

## 2020-09-10 PROCEDURE — 77063 BREAST TOMOSYNTHESIS BI: CPT | Mod: 26,,, | Performed by: RADIOLOGY

## 2020-09-10 PROCEDURE — 77063 MAMMO DIGITAL SCREENING BILAT WITH TOMOSYNTHESIS_CAD: ICD-10-PCS | Mod: 26,,, | Performed by: RADIOLOGY

## 2020-09-10 PROCEDURE — 77067 SCR MAMMO BI INCL CAD: CPT | Mod: 26,,, | Performed by: RADIOLOGY

## 2020-09-10 PROCEDURE — 77067 SCR MAMMO BI INCL CAD: CPT | Mod: TC

## 2020-09-10 PROCEDURE — 77067 MAMMO DIGITAL SCREENING BILAT WITH TOMOSYNTHESIS_CAD: ICD-10-PCS | Mod: 26,,, | Performed by: RADIOLOGY

## 2020-09-19 ENCOUNTER — PATIENT MESSAGE (OUTPATIENT)
Dept: PAIN MEDICINE | Facility: CLINIC | Age: 60
End: 2020-09-19

## 2020-09-21 DIAGNOSIS — I71.40 ABDOMINAL AORTIC ANEURYSM WITHOUT RUPTURE: Primary | ICD-10-CM

## 2020-09-22 ENCOUNTER — PATIENT MESSAGE (OUTPATIENT)
Dept: ENDOCRINOLOGY | Facility: CLINIC | Age: 60
End: 2020-09-22

## 2020-09-22 ENCOUNTER — HOSPITAL ENCOUNTER (OUTPATIENT)
Dept: RADIOLOGY | Facility: HOSPITAL | Age: 60
Discharge: HOME | End: 2020-09-22
Attending: INTERNAL MEDICINE

## 2020-09-22 ENCOUNTER — PATIENT MESSAGE (OUTPATIENT)
Dept: PAIN MEDICINE | Facility: CLINIC | Age: 60
End: 2020-09-22

## 2020-09-22 DIAGNOSIS — M85.80 OSTEOPENIA, UNSPECIFIED LOCATION: ICD-10-CM

## 2020-09-22 DIAGNOSIS — M79.18 MYOFASCIAL PAIN SYNDROME OF LUMBAR SPINE: Primary | ICD-10-CM

## 2020-09-22 DIAGNOSIS — E05.90 HYPERTHYROIDISM: ICD-10-CM

## 2020-09-22 PROCEDURE — 77080 DEXA BONE DENSITY SPINE HIP: ICD-10-PCS | Mod: 26,,, | Performed by: RADIOLOGY

## 2020-09-22 PROCEDURE — 77080 DXA BONE DENSITY AXIAL: CPT | Mod: TC

## 2020-09-22 PROCEDURE — 77080 DXA BONE DENSITY AXIAL: CPT | Mod: 26,,, | Performed by: RADIOLOGY

## 2020-09-22 RX ORDER — TIZANIDINE 4 MG/1
4 TABLET ORAL EVERY 8 HOURS
Qty: 90 TABLET | Refills: 0 | Status: SHIPPED | OUTPATIENT
Start: 2020-09-22 | End: 2020-10-22

## 2020-09-22 RX ORDER — METHIMAZOLE 10 MG/1
20 TABLET ORAL 2 TIMES DAILY
Qty: 120 TABLET | Refills: 11 | Status: SHIPPED | OUTPATIENT
Start: 2020-09-22 | End: 2020-09-24 | Stop reason: ALTCHOICE

## 2020-09-22 NOTE — TELEPHONE ENCOUNTER
Recent Results (from the past 168 hour(s))   TSH    Collection Time: 09/22/20 12:17 PM   Result Value Ref Range    TSH <0.010 (L) 0.340 - 5.600 uIU/mL   T4, free    Collection Time: 09/22/20 12:17 PM   Result Value Ref Range    Free T4 2.30 (H) 0.71 - 1.51 ng/dL   Comprehensive metabolic panel    Collection Time: 09/22/20 12:17 PM   Result Value Ref Range    Sodium 140 136 - 145 mmol/L    Potassium 3.7 3.5 - 5.1 mmol/L    Chloride 107 95 - 110 mmol/L    CO2 23 23 - 29 mmol/L    Glucose 134 (H) 70 - 110 mg/dL    BUN, Bld 15 6 - 20 mg/dL    Creatinine 1.0 0.5 - 1.4 mg/dL    Calcium 9.4 8.7 - 10.5 mg/dL    Total Protein 6.7 6.0 - 8.4 g/dL    Albumin 3.4 (L) 3.5 - 5.2 g/dL    Total Bilirubin 0.3 0.1 - 1.0 mg/dL    Alkaline Phosphatase 104 55 - 135 U/L    AST 54 (H) 10 - 40 U/L    ALT 81 (H) 10 - 44 U/L    Anion Gap 10 8 - 16 mmol/L    eGFR if African American >60.0 >60 mL/min/1.73 m^2    eGFR if non African American >60.0 >60 mL/min/1.73 m^2   CBC auto differential    Collection Time: 09/22/20 12:17 PM   Result Value Ref Range    WBC 4.48 3.90 - 12.70 K/uL    RBC 3.90 (L) 4.00 - 5.40 M/uL    Hemoglobin 11.7 (L) 12.0 - 16.0 g/dL    Hematocrit 34.3 (L) 37.0 - 48.5 %    Mean Corpuscular Volume 88 82 - 98 fL    Mean Corpuscular Hemoglobin 30.0 27.0 - 31.0 pg    Mean Corpuscular Hemoglobin Conc 34.1 32.0 - 36.0 g/dL    RDW 13.1 11.5 - 14.5 %    Platelets 311 150 - 350 K/uL    MPV 8.9 (L) 9.2 - 12.9 fL    Immature Granulocytes 0.0 0.0 - 0.5 %    Gran # (ANC) 2.1 1.8 - 7.7 K/uL    Immature Grans (Abs) 0.00 0.00 - 0.04 K/uL    Lymph # 1.8 1.0 - 4.8 K/uL    Mono # 0.3 0.3 - 1.0 K/uL    Eos # 0.3 0.0 - 0.5 K/uL    Baso # 0.03 0.00 - 0.20 K/uL    nRBC 0 0 /100 WBC    Gran% 47.0 38.0 - 73.0 %    Lymph% 39.1 18.0 - 48.0 %    Mono% 7.4 4.0 - 15.0 %    Eosinophil% 5.8 0.0 - 8.0 %    Basophil% 0.7 0.0 - 1.9 %    Differential Method Automated      TSH low, free T4 high.  TSI pending    Last free T4 2.43, now free T4 2.3 so slight  improvement.    On 20 mg/day methimazole.   - increase to 20 mg BID   - recheck in 1-2 months.    DXA with osteopenia. FRAX low. Fix thyroid to avoid accelerated bone loss.

## 2020-09-23 ENCOUNTER — PATIENT MESSAGE (OUTPATIENT)
Dept: ENDOCRINOLOGY | Facility: CLINIC | Age: 60
End: 2020-09-23

## 2020-09-23 DIAGNOSIS — E05.90 HYPERTHYROIDISM: Primary | ICD-10-CM

## 2020-09-23 NOTE — TELEPHONE ENCOUNTER
Methimazole can occasionally cause headache. Not a common symptom.    Can use PTU if headaches don't resolve.

## 2020-09-24 ENCOUNTER — PATIENT MESSAGE (OUTPATIENT)
Dept: ENDOCRINOLOGY | Facility: CLINIC | Age: 60
End: 2020-09-24

## 2020-09-24 RX ORDER — PROPYLTHIOURACIL 50 MG/1
100 TABLET ORAL 3 TIMES DAILY
Qty: 180 TABLET | Refills: 5 | Status: SHIPPED | OUTPATIENT
Start: 2020-09-24 | End: 2021-03-22

## 2020-09-28 ENCOUNTER — HOSPITAL ENCOUNTER (OUTPATIENT)
Dept: RADIOLOGY | Facility: HOSPITAL | Age: 60
Discharge: HOME | End: 2020-09-28
Attending: NURSE PRACTITIONER

## 2020-09-28 DIAGNOSIS — I71.40 ABDOMINAL AORTIC ANEURYSM WITHOUT RUPTURE: ICD-10-CM

## 2020-09-28 PROCEDURE — 76700 US EXAM ABDOM COMPLETE: CPT | Mod: TC

## 2020-09-28 PROCEDURE — 76700 US ABDOMEN COMPLETE: ICD-10-PCS | Mod: 26,,, | Performed by: RADIOLOGY

## 2020-09-28 PROCEDURE — 76700 US EXAM ABDOM COMPLETE: CPT | Mod: 26,,, | Performed by: RADIOLOGY

## 2020-09-30 DIAGNOSIS — R51.9 HEADACHE: Primary | ICD-10-CM

## 2020-10-02 ENCOUNTER — HOSPITAL ENCOUNTER (OUTPATIENT)
Dept: RADIOLOGY | Facility: HOSPITAL | Age: 60
Discharge: HOME | End: 2020-10-02
Attending: NURSE PRACTITIONER

## 2020-10-02 DIAGNOSIS — R51.9 HEADACHE: ICD-10-CM

## 2020-10-02 PROCEDURE — 70450 CT HEAD/BRAIN W/O DYE: CPT | Mod: TC

## 2020-10-02 PROCEDURE — 70450 CT HEAD/BRAIN W/O DYE: CPT | Mod: 26,,, | Performed by: RADIOLOGY

## 2020-10-02 PROCEDURE — 70450 CT HEAD WITHOUT CONTRAST: ICD-10-PCS | Mod: 26,,, | Performed by: RADIOLOGY

## 2020-10-05 ENCOUNTER — PATIENT MESSAGE (OUTPATIENT)
Dept: PAIN MEDICINE | Facility: CLINIC | Age: 60
End: 2020-10-05

## 2021-03-18 ENCOUNTER — OFFICE VISIT (OUTPATIENT)
Dept: PODIATRY | Facility: CLINIC | Age: 61
End: 2021-03-18

## 2021-03-18 VITALS
DIASTOLIC BLOOD PRESSURE: 77 MMHG | HEART RATE: 89 BPM | SYSTOLIC BLOOD PRESSURE: 129 MMHG | RESPIRATION RATE: 18 BRPM | TEMPERATURE: 98 F | WEIGHT: 162 LBS | OXYGEN SATURATION: 98 % | BODY MASS INDEX: 26.99 KG/M2 | HEIGHT: 65 IN

## 2021-03-18 DIAGNOSIS — L60.0 INGROWN NAIL OF GREAT TOE OF LEFT FOOT: ICD-10-CM

## 2021-03-18 DIAGNOSIS — L60.1 ONYCHOLYSIS OF TOENAIL: Primary | ICD-10-CM

## 2021-03-18 PROCEDURE — 99999 PR PBB SHADOW E&M-EST. PATIENT-LVL III: ICD-10-PCS | Mod: PBBFAC,,, | Performed by: PODIATRIST

## 2021-03-18 PROCEDURE — 99213 OFFICE O/P EST LOW 20 MIN: CPT | Mod: PBBFAC | Performed by: PODIATRIST

## 2021-03-18 PROCEDURE — 99202 PR OFFICE/OUTPT VISIT, NEW, LEVL II, 15-29 MIN: ICD-10-PCS | Mod: S$PBB,,, | Performed by: PODIATRIST

## 2021-03-18 PROCEDURE — 99999 PR PBB SHADOW E&M-EST. PATIENT-LVL III: CPT | Mod: PBBFAC,,, | Performed by: PODIATRIST

## 2021-03-18 PROCEDURE — 99202 OFFICE O/P NEW SF 15 MIN: CPT | Mod: S$PBB,,, | Performed by: PODIATRIST

## 2021-03-18 RX ORDER — ERGOCALCIFEROL 1.25 MG/1
1 CAPSULE ORAL
COMMUNITY
Start: 2020-09-21

## 2021-04-18 ENCOUNTER — PATIENT MESSAGE (OUTPATIENT)
Dept: PODIATRY | Facility: CLINIC | Age: 61
End: 2021-04-18

## 2021-07-19 DIAGNOSIS — E05.90 THYROTOXICOSIS, UNSPECIFIED WITHOUT THYROTOXIC CRISIS OR STORM: Primary | ICD-10-CM

## 2021-08-06 DIAGNOSIS — J45.998 OTHER ASTHMA: Primary | ICD-10-CM

## 2021-09-02 ENCOUNTER — HOSPITAL ENCOUNTER (OUTPATIENT)
Dept: RADIOLOGY | Facility: HOSPITAL | Age: 61
Discharge: HOME | End: 2021-09-02
Attending: NURSE PRACTITIONER

## 2021-09-02 DIAGNOSIS — E05.90 THYROTOXICOSIS, UNSPECIFIED WITHOUT THYROTOXIC CRISIS OR STORM: ICD-10-CM

## 2021-09-02 DIAGNOSIS — J45.998 OTHER ASTHMA: ICD-10-CM

## 2021-09-02 PROCEDURE — 71046 XR CHEST PA AND LATERAL: ICD-10-PCS | Mod: 26,,, | Performed by: RADIOLOGY

## 2021-09-02 PROCEDURE — 76536 US THYROID: ICD-10-PCS | Mod: 26,,, | Performed by: RADIOLOGY

## 2021-09-02 PROCEDURE — 71046 X-RAY EXAM CHEST 2 VIEWS: CPT | Mod: 26,,, | Performed by: RADIOLOGY

## 2021-09-02 PROCEDURE — 76536 US EXAM OF HEAD AND NECK: CPT | Mod: 26,,, | Performed by: RADIOLOGY

## 2021-09-02 PROCEDURE — 76536 US EXAM OF HEAD AND NECK: CPT | Mod: TC

## 2021-09-02 PROCEDURE — 71046 X-RAY EXAM CHEST 2 VIEWS: CPT | Mod: TC,FY

## 2021-09-04 ENCOUNTER — LAB VISIT (OUTPATIENT)
Dept: LAB | Facility: HOSPITAL | Age: 61
End: 2021-09-04
Attending: NURSE PRACTITIONER

## 2021-09-04 DIAGNOSIS — Z03.818 ENCNTR FOR OBS FOR SUSP EXPSR TO OTH BIOLG AGENTS RULED OUT: Primary | ICD-10-CM

## 2021-09-04 LAB
SARS-COV-2 IGG SERPL IA-ACNC: <50 AU/ML
SARS-COV-2 IGG SERPL QL IA: NEGATIVE

## 2021-09-04 PROCEDURE — 86769 SARS-COV-2 COVID-19 ANTIBODY: CPT | Performed by: NURSE PRACTITIONER

## 2021-09-04 PROCEDURE — 36415 COLL VENOUS BLD VENIPUNCTURE: CPT | Performed by: NURSE PRACTITIONER

## 2021-09-14 ENCOUNTER — TELEPHONE (OUTPATIENT)
Dept: ENDOCRINOLOGY | Facility: CLINIC | Age: 61
End: 2021-09-14

## 2021-09-14 ENCOUNTER — PATIENT MESSAGE (OUTPATIENT)
Dept: ENDOCRINOLOGY | Facility: CLINIC | Age: 61
End: 2021-09-14

## 2021-09-21 ENCOUNTER — LAB VISIT (OUTPATIENT)
Dept: LAB | Facility: HOSPITAL | Age: 61
End: 2021-09-21
Attending: INTERNAL MEDICINE

## 2021-09-21 DIAGNOSIS — E05.90 HYPERTHYROIDISM: ICD-10-CM

## 2021-09-21 LAB
T4 FREE SERPL-MCNC: 1.84 NG/DL (ref 0.71–1.51)
TSH SERPL DL<=0.005 MIU/L-ACNC: <0.01 UIU/ML (ref 0.4–4)

## 2021-09-21 PROCEDURE — 84439 ASSAY OF FREE THYROXINE: CPT | Performed by: INTERNAL MEDICINE

## 2021-09-21 PROCEDURE — 84443 ASSAY THYROID STIM HORMONE: CPT | Performed by: INTERNAL MEDICINE

## 2021-09-21 PROCEDURE — 36415 COLL VENOUS BLD VENIPUNCTURE: CPT | Performed by: INTERNAL MEDICINE

## 2021-09-24 ENCOUNTER — PATIENT MESSAGE (OUTPATIENT)
Dept: ENDOCRINOLOGY | Facility: CLINIC | Age: 61
End: 2021-09-24

## 2021-10-04 ENCOUNTER — HOSPITAL ENCOUNTER (OUTPATIENT)
Dept: RADIOLOGY | Facility: HOSPITAL | Age: 61
Discharge: HOME | End: 2021-10-04
Attending: NURSE PRACTITIONER

## 2021-10-04 DIAGNOSIS — I71.40 ABDOMINAL AORTIC ANEURYSM WITHOUT RUPTURE: ICD-10-CM

## 2021-10-04 PROCEDURE — 76775 US ABDOMINAL AORTA: ICD-10-PCS | Mod: 26,,, | Performed by: RADIOLOGY

## 2021-10-04 PROCEDURE — 76775 US EXAM ABDO BACK WALL LIM: CPT | Mod: TC

## 2021-10-04 PROCEDURE — 76775 US EXAM ABDO BACK WALL LIM: CPT | Mod: 26,,, | Performed by: RADIOLOGY

## 2021-10-09 ENCOUNTER — HOSPITAL ENCOUNTER (OUTPATIENT)
Dept: RADIOLOGY | Facility: HOSPITAL | Age: 61
Discharge: HOME | End: 2021-10-09
Attending: NURSE PRACTITIONER

## 2021-10-09 DIAGNOSIS — I71.40 ABDOMINAL AORTIC ANEURYSM WITHOUT RUPTURE: ICD-10-CM

## 2021-10-09 PROCEDURE — 74177 CT ABDOMEN PELVIS WITH CONTRAST: ICD-10-PCS | Mod: 26,,, | Performed by: RADIOLOGY

## 2021-10-09 PROCEDURE — 25500020 PHARM REV CODE 255

## 2021-10-09 PROCEDURE — 74177 CT ABD & PELVIS W/CONTRAST: CPT | Mod: 26,,, | Performed by: RADIOLOGY

## 2021-10-09 PROCEDURE — 74177 CT ABD & PELVIS W/CONTRAST: CPT | Mod: TC

## 2021-10-09 RX ADMIN — IOHEXOL 75 ML: 350 INJECTION, SOLUTION INTRAVENOUS at 09:10

## 2021-10-13 ENCOUNTER — OFFICE VISIT (OUTPATIENT)
Dept: ENDOCRINOLOGY | Facility: CLINIC | Age: 61
End: 2021-10-13

## 2021-10-13 DIAGNOSIS — E05.90 HYPERTHYROIDISM: Primary | ICD-10-CM

## 2021-10-13 DIAGNOSIS — E04.2 MULTIPLE THYROID NODULES: ICD-10-CM

## 2021-10-13 DIAGNOSIS — E55.9 VITAMIN D INSUFFICIENCY: ICD-10-CM

## 2021-10-13 DIAGNOSIS — M85.80 OSTEOPENIA, UNSPECIFIED LOCATION: ICD-10-CM

## 2021-10-13 PROCEDURE — 99215 PR OFFICE/OUTPT VISIT, EST, LEVL V, 40-54 MIN: ICD-10-PCS | Mod: 95,,, | Performed by: INTERNAL MEDICINE

## 2021-10-13 PROCEDURE — 99215 OFFICE O/P EST HI 40 MIN: CPT | Mod: 95,,, | Performed by: INTERNAL MEDICINE

## 2021-10-13 RX ORDER — PROPRANOLOL HYDROCHLORIDE 20 MG/1
20 TABLET ORAL 2 TIMES DAILY
Qty: 60 TABLET | Refills: 1 | Status: SHIPPED | OUTPATIENT
Start: 2021-10-13 | End: 2022-01-06

## 2021-10-19 ENCOUNTER — PATIENT MESSAGE (OUTPATIENT)
Dept: ENDOCRINOLOGY | Facility: CLINIC | Age: 61
End: 2021-10-19

## 2021-11-08 ENCOUNTER — HOSPITAL ENCOUNTER (OUTPATIENT)
Dept: RADIOLOGY | Facility: HOSPITAL | Age: 61
Discharge: HOME | End: 2021-11-08
Attending: NURSE PRACTITIONER

## 2021-11-08 DIAGNOSIS — I71.40 ABDOMINAL AORTIC ANEURYSM WITHOUT RUPTURE: ICD-10-CM

## 2021-11-09 ENCOUNTER — PATIENT MESSAGE (OUTPATIENT)
Dept: ENDOCRINOLOGY | Facility: CLINIC | Age: 61
End: 2021-11-09

## 2021-11-12 ENCOUNTER — HOSPITAL ENCOUNTER (OUTPATIENT)
Dept: RADIOLOGY | Facility: HOSPITAL | Age: 61
Discharge: HOME | End: 2021-11-12
Attending: INTERNAL MEDICINE

## 2021-11-12 DIAGNOSIS — E04.2 MULTIPLE THYROID NODULES: ICD-10-CM

## 2021-11-12 PROCEDURE — 10005 FNA BX W/US GDN 1ST LES: CPT

## 2021-11-12 PROCEDURE — 10005 US FINE NEEDLE ASPIRATION THYROID, FIRST LESION: ICD-10-PCS | Mod: ,,, | Performed by: RADIOLOGY

## 2021-11-12 PROCEDURE — 10005 FNA BX W/US GDN 1ST LES: CPT | Mod: ,,, | Performed by: RADIOLOGY

## 2021-11-12 PROCEDURE — 88173 CYTOPATH EVAL FNA REPORT: CPT | Performed by: STUDENT IN AN ORGANIZED HEALTH CARE EDUCATION/TRAINING PROGRAM

## 2021-11-12 PROCEDURE — 88173 PR  INTERPRETATION OF FNA SMEAR: ICD-10-PCS | Mod: 26,,, | Performed by: STUDENT IN AN ORGANIZED HEALTH CARE EDUCATION/TRAINING PROGRAM

## 2021-11-12 PROCEDURE — 88173 CYTOPATH EVAL FNA REPORT: CPT | Mod: 26,,, | Performed by: STUDENT IN AN ORGANIZED HEALTH CARE EDUCATION/TRAINING PROGRAM

## 2021-11-14 ENCOUNTER — HOSPITAL ENCOUNTER (OUTPATIENT)
Dept: RADIOLOGY | Facility: HOSPITAL | Age: 61
Discharge: HOME | End: 2021-11-14
Attending: NURSE PRACTITIONER

## 2021-11-14 PROCEDURE — 74174 CTA ABDOMEN AND PELVIS: ICD-10-PCS | Mod: 26,,, | Performed by: RADIOLOGY

## 2021-11-14 PROCEDURE — 74174 CTA ABD&PLVS W/CONTRAST: CPT | Mod: 26,,, | Performed by: RADIOLOGY

## 2021-11-14 PROCEDURE — 25500020 PHARM REV CODE 255

## 2021-11-14 PROCEDURE — 74174 CTA ABD&PLVS W/CONTRAST: CPT | Mod: TC

## 2021-11-14 RX ADMIN — IOHEXOL 75 ML: 350 INJECTION, SOLUTION INTRAVENOUS at 09:11

## 2021-11-15 ENCOUNTER — PATIENT MESSAGE (OUTPATIENT)
Dept: ENDOCRINOLOGY | Facility: CLINIC | Age: 61
End: 2021-11-15

## 2021-11-15 DIAGNOSIS — E05.90 HYPERTHYROIDISM: Primary | ICD-10-CM

## 2021-11-18 LAB
FINAL PATHOLOGIC DIAGNOSIS: ABNORMAL
Lab: ABNORMAL

## 2021-11-19 ENCOUNTER — PATIENT MESSAGE (OUTPATIENT)
Dept: ENDOCRINOLOGY | Facility: CLINIC | Age: 61
End: 2021-11-19

## 2021-11-19 DIAGNOSIS — E04.2 MULTIPLE THYROID NODULES: Primary | ICD-10-CM

## 2021-11-21 ENCOUNTER — PATIENT MESSAGE (OUTPATIENT)
Dept: ENDOCRINOLOGY | Facility: CLINIC | Age: 61
End: 2021-11-21

## 2022-01-05 DIAGNOSIS — E05.90 HYPERTHYROIDISM: ICD-10-CM

## 2022-01-06 RX ORDER — PROPRANOLOL HYDROCHLORIDE 20 MG/1
20 TABLET ORAL 2 TIMES DAILY
Qty: 60 TABLET | Refills: 1 | Status: SHIPPED | OUTPATIENT
Start: 2022-01-06 | End: 2022-12-02 | Stop reason: SDUPTHER

## 2022-08-29 ENCOUNTER — HOSPITAL ENCOUNTER (EMERGENCY)
Facility: HOSPITAL | Age: 62
Discharge: HOME | End: 2022-08-29
Attending: EMERGENCY MEDICINE

## 2022-08-29 VITALS
RESPIRATION RATE: 20 BRPM | TEMPERATURE: 98 F | HEART RATE: 100 BPM | BODY MASS INDEX: 21.83 KG/M2 | SYSTOLIC BLOOD PRESSURE: 165 MMHG | OXYGEN SATURATION: 98 % | DIASTOLIC BLOOD PRESSURE: 70 MMHG | WEIGHT: 131 LBS | HEIGHT: 65 IN

## 2022-08-29 DIAGNOSIS — L03.211 FACIAL CELLULITIS: Primary | ICD-10-CM

## 2022-08-29 PROCEDURE — 70486 CT MAXILLOFACIAL WITHOUT CONTRAST: ICD-10-PCS | Mod: 26,,, | Performed by: RADIOLOGY

## 2022-08-29 PROCEDURE — 70486 CT MAXILLOFACIAL W/O DYE: CPT | Mod: 26,,, | Performed by: RADIOLOGY

## 2022-08-29 PROCEDURE — 99284 EMERGENCY DEPT VISIT MOD MDM: CPT | Mod: 25

## 2022-08-29 PROCEDURE — 70486 CT MAXILLOFACIAL W/O DYE: CPT | Mod: TC

## 2022-08-29 RX ORDER — CLINDAMYCIN HYDROCHLORIDE 300 MG/1
300 CAPSULE ORAL EVERY 6 HOURS
COMMUNITY
End: 2022-11-03

## 2022-08-29 NOTE — ED TRIAGE NOTES
[facility] seen at [facility] and diagnosed with Cellulitis . Given a shot of clindamycin yesterday and placed on clindamycin po . Patient states that the area actually looks better but it is spreading around her left eye.

## 2022-08-29 NOTE — ED PROVIDER NOTES
Encounter Date: 8/29/2022       History     Chief Complaint   Patient presents with    Cellulitis to face      Patient seen at [facility] yesterday.Diagnosed with cellulitis . Placed on clindamycin. Patient states the area looks better on her face but the redness has spread around her Left eye,      62-year-old white female presents to emergency room with complaints of facial edema on the left side related to abscess tooth.  Patient reports she was seen in [facility] ER yesterday and diagnosed with facial cellulitis.  Patient was treated with clindamycin injection in clinic and discharged on clindamycin.  Patient advised to come to emergency room for worsening symptoms.  She states she is concerned as she is not experiencing increased tenderness as site and left eye pain.    Review of patient's allergies indicates:   Allergen Reactions    Sulfa (sulfonamide antibiotics)      Past Medical History:   Diagnosis Date    Arthritis     Asthma     Hyperthyroidism 8/11/2020    Malignant neoplasm of skin 2019    Non-melanoma skin cancer    Osteopenia      Past Surgical History:   Procedure Laterality Date    CHOLECYSTECTOMY      EPIDURAL STEROID INJECTION N/A 8/27/2020    Procedure: Injection, Steroid, Epidural Lumbar    Surgeon: Luan Russell MD;  Location: [facility];  Service: Pain Management;  Laterality: N/A;  L4-L-5    HILARIO  12/21/2018    FRACTURE SURGERY      HYSTERECTOMY      OOPHORECTOMY       Family History   Problem Relation Age of Onset    COPD Mother     Congenital heart disease Mother     Diabetes type I Sister     Heart attack Father     Thyroid disease Neg Hx     Breast cancer Neg Hx      Social History     Tobacco Use    Smoking status: Every Day     Packs/day: 1.00     Types: Cigarettes    Smokeless tobacco: Current   Substance Use Topics    Alcohol use: Yes    Drug use: Never     Review of Systems   HENT:  Positive for dental problem and facial swelling.    Eyes:  Positive for pain.   All other systems  reviewed and are negative.    Physical Exam     Initial Vitals [08/29/22 1705]   BP Pulse Resp Temp SpO2   (!) 158/82 100 18 98 °F (36.7 °C) 98 %      MAP       --         Physical Exam    Nursing note and vitals reviewed.  Constitutional: She appears well-developed and well-nourished.   HENT:   Head: Head is with left periorbital erythema.   Eyes: Conjunctivae are normal.   Neck:   Normal range of motion.  Cardiovascular:  Normal rate.           Pulmonary/Chest: Breath sounds normal.   Abdominal: Abdomen is soft.   Musculoskeletal:         General: Normal range of motion.      Cervical back: Normal range of motion.     Neurological: She is alert and oriented to person, place, and time.   Skin: Capillary refill takes less than 2 seconds.       ED Course   Procedures  Labs Reviewed - No data to display       Imaging Results              CT Maxillofacial Without Contrast (Final result)  Result time 08/29/22 18:57:27      Final result by Cosme Mera Jr., MD (08/29/22 18:57:27)                   Impression:      Mild edema of the left cheek and periorbital area without a focal abscess.  Underlying abnormalities of the orbit or sinus are not seen.      Electronically signed by: Cosme Mera MD  Date:    08/29/2022  Time:    18:57               Narrative:    EXAMINATION:  CT MAXILLOFACIAL WITHOUT CONTRAST    CLINICAL HISTORY:  Maxillary/facial abscess;    TECHNIQUE:  Low dose axial images, sagittal and coronal reformations were obtained through the face.  Contrast was not administered.    COMPARISON:  None    FINDINGS:  A fracture of the mandible, maxilla, zygomas, sinus walls, orbital rims and walls, nasal bone or frontal bone is not seen.  The paranasal sinuses are clear without air-fluid level soft tissue masses or expansile lesions demonstrated.  The orbits appear within normal limits.  The globes are intact.  Retrobulbar edema or fluid is not seen.    There is mild edema of the left cheek and periorbital  area.  A focal fluid collection however is not seen.  A mass is not noted.                                       Medications - No data to display  Medical Decision Making:   Initial Assessment:   62-year-old white female presents to emergency room with complaints of facial edema on the left side related to abscess tooth.  Patient reports she was seen in [facility] ER yesterday and diagnosed with facial cellulitis.  Patient was treated with clindamycin injection in clinic and discharged on clindamycin.  Patient advised to come to emergency room for worsening symptoms.  She states she is concerned as she is not experiencing increased tenderness as site and left eye pain.  Differential Diagnosis:   Cellulitis  Dental Abscess    Clinical Tests:   Radiological Study: Ordered  ED Management:  Will D/C home with instructions to continue Clindamycin                      Clinical Impression:   Final diagnoses:  [L03.211] Facial cellulitis (Primary)      ED Disposition Condition    Discharge Stable          ED Prescriptions    None       Follow-up Information       Follow up With Specialties Details Why Contact Info    Tanner Denson NP Family Medicine In 1 day  [address] [facility] [address]  [phone]               Vance Stewart MD  11/05/22 [id number]

## 2022-08-29 NOTE — Clinical Note
"Michelle"Michelle" Miller was seen and treated in our emergency department on 8/29/2022.  She may return to work on 08/31/2022.       If you have any questions or concerns, please don't hesitate to call.      Ester AUGUST    "

## 2022-08-30 NOTE — DISCHARGE INSTRUCTIONS
Apply cool compresses to face as needed.  Continue antibiotic therapy as previously prescribed.  Follow up primary care as previously scheduled.

## 2022-08-30 NOTE — ED PROVIDER NOTES
Encounter Date: 8/29/2022       History     Chief Complaint   Patient presents with    Cellulitis to face      Patient seen at [facility] yesterday.Diagnosed with cellulitis . Placed on clindamycin. Patient states the area looks better on her face but the redness has spread around her Left eye,      62-year-old white female presents to emergency room with complaints of facial edema on the left side related to abscess tooth.  Patient reports she was seen in [facility] ER yesterday and diagnosed with facial cellulitis.  Patient was treated with clindamycin injection in clinic and discharged on clindamycin.  Patient advised to come to emergency room for worsening symptoms.  She states she is concerned as she is not experiencing increased tenderness as site and left eye pain.    Review of patient's allergies indicates:   Allergen Reactions    Sulfa (sulfonamide antibiotics)      Past Medical History:   Diagnosis Date    Arthritis     Asthma     Hyperthyroidism 8/11/2020    Malignant neoplasm of skin 2019    Non-melanoma skin cancer    Osteopenia      Past Surgical History:   Procedure Laterality Date    CHOLECYSTECTOMY      EPIDURAL STEROID INJECTION N/A 8/27/2020    Procedure: Injection, Steroid, Epidural Lumbar  ***patient request to be 1st**;  Surgeon: Luan Russell MD;  Location: [facility];  Service: Pain Management;  Laterality: N/A;  L4-L-5    HILARIO  12/21/2018    FRACTURE SURGERY      HYSTERECTOMY      OOPHORECTOMY       Family History   Problem Relation Age of Onset    COPD Mother     Congenital heart disease Mother     Diabetes type I Sister     Heart attack Father     Thyroid disease Neg Hx     Breast cancer Neg Hx      Social History     Tobacco Use    Smoking status: Every Day     Packs/day: 1.00     Types: Cigarettes    Smokeless tobacco: Current   Substance Use Topics    Alcohol use: Yes    Drug use: Never     Review of Systems   HENT:  Positive for dental problem and facial swelling.     All other systems reviewed and are negative.    Physical Exam     Initial Vitals [08/29/22 1705]   BP Pulse Resp Temp SpO2   (!) 158/82 100 18 98 °F (36.7 °C) 98 %      MAP       --         Physical Exam    ED Course   Procedures  Labs Reviewed - No data to display       Imaging Results              CT Maxillofacial Without Contrast (Final result)  Result time 08/29/22 18:57:27      Final result by Cosme Mera Jr., MD (08/29/22 18:57:27)                   Impression:      Mild edema of the left cheek and periorbital area without a focal abscess.  Underlying abnormalities of the orbit or sinus are not seen.      Electronically signed by: Cosme Mera MD  Date:    08/29/2022  Time:    18:57               Narrative:    EXAMINATION:  CT MAXILLOFACIAL WITHOUT CONTRAST    CLINICAL HISTORY:  Maxillary/facial abscess;    TECHNIQUE:  Low dose axial images, sagittal and coronal reformations were obtained through the face.  Contrast was not administered.    COMPARISON:  None    FINDINGS:  A fracture of the mandible, maxilla, zygomas, sinus walls, orbital rims and walls, nasal bone or frontal bone is not seen.  The paranasal sinuses are clear without air-fluid level soft tissue masses or expansile lesions demonstrated.  The orbits appear within normal limits.  The globes are intact.  Retrobulbar edema or fluid is not seen.    There is mild edema of the left cheek and periorbital area.  A focal fluid collection however is not seen.  A mass is not noted.                                       Medications - No data to display  Medical Decision Making:   ED Management:  Will order maxillofacial CT  Maxillofacial CT negative will DC home with instructions to follow-up primary care                    Clinical Impression:   Final diagnoses:  [L03.211] Facial cellulitis (Primary)      ED Disposition Condition    Discharge Stable          ED Prescriptions    None       Follow-up Information       Follow up With Specialties  Details Why Contact Info    Tanner Denson NP Family Medicine In 1 day  [address]  [facility] [place] [address] [phone]

## 2022-08-31 ENCOUNTER — PATIENT MESSAGE (OUTPATIENT)
Dept: ENDOCRINOLOGY | Facility: CLINIC | Age: 62
End: 2022-08-31

## 2022-10-10 ENCOUNTER — HOSPITAL ENCOUNTER (OUTPATIENT)
Dept: RADIOLOGY | Facility: HOSPITAL | Age: 62
Discharge: HOME | End: 2022-10-10
Attending: INTERNAL MEDICINE

## 2022-10-10 DIAGNOSIS — E04.2 MULTIPLE THYROID NODULES: ICD-10-CM

## 2022-10-10 PROCEDURE — 88173 CYTOPATH EVAL FNA REPORT: CPT | Mod: 26,,, | Performed by: PATHOLOGY

## 2022-10-10 PROCEDURE — 10005 FNA BX W/US GDN 1ST LES: CPT | Mod: ,,, | Performed by: RADIOLOGY

## 2022-10-10 PROCEDURE — 10005 US FINE NEEDLE ASPIRATION THYROID, FIRST LESION: ICD-10-PCS | Mod: ,,, | Performed by: RADIOLOGY

## 2022-10-10 PROCEDURE — 88173 CYTOPATH EVAL FNA REPORT: CPT | Performed by: PATHOLOGY

## 2022-10-10 PROCEDURE — 88173 PR  INTERPRETATION OF FNA SMEAR: ICD-10-PCS | Mod: 26,,, | Performed by: PATHOLOGY

## 2022-10-10 PROCEDURE — 10005 FNA BX W/US GDN 1ST LES: CPT

## 2022-10-10 PROCEDURE — 25000003 PHARM REV CODE 250: Performed by: RADIOLOGY

## 2022-10-10 RX ORDER — LIDOCAINE HYDROCHLORIDE 20 MG/ML
10 INJECTION, SOLUTION INFILTRATION; PERINEURAL ONCE
Status: COMPLETED | OUTPATIENT
Start: 2022-10-10 | End: 2022-10-10

## 2022-10-10 RX ADMIN — LIDOCAINE HYDROCHLORIDE 10 ML: 20 INJECTION, SOLUTION INFILTRATION; PERINEURAL at 01:10

## 2022-10-13 ENCOUNTER — HOSPITAL ENCOUNTER (OUTPATIENT)
Dept: RADIOLOGY | Facility: HOSPITAL | Age: 62
Discharge: HOME | End: 2022-10-13
Attending: INTERNAL MEDICINE

## 2022-10-13 ENCOUNTER — HOSPITAL ENCOUNTER (OUTPATIENT)
Dept: RADIOLOGY | Facility: HOSPITAL | Age: 62
Discharge: HOME | End: 2022-10-13
Attending: NURSE PRACTITIONER

## 2022-10-13 DIAGNOSIS — Z12.31 BREAST CANCER SCREENING BY MAMMOGRAM: ICD-10-CM

## 2022-10-13 DIAGNOSIS — M85.80 OSTEOPENIA, UNSPECIFIED LOCATION: ICD-10-CM

## 2022-10-13 PROCEDURE — 77080 DXA BONE DENSITY AXIAL: CPT | Mod: 26,,, | Performed by: RADIOLOGY

## 2022-10-13 PROCEDURE — 77080 DXA BONE DENSITY AXIAL: CPT | Mod: TC

## 2022-10-13 PROCEDURE — 77080 DEXA BONE DENSITY SPINE HIP: ICD-10-PCS | Mod: 26,,, | Performed by: RADIOLOGY

## 2022-10-14 ENCOUNTER — PATIENT MESSAGE (OUTPATIENT)
Dept: ENDOCRINOLOGY | Facility: CLINIC | Age: 62
End: 2022-10-14

## 2022-10-14 LAB
COMMENT: ABNORMAL
FINAL PATHOLOGIC DIAGNOSIS: ABNORMAL
Lab: ABNORMAL
MICROSCOPIC EXAM: ABNORMAL

## 2022-10-20 ENCOUNTER — OFFICE VISIT (OUTPATIENT)
Dept: ENDOCRINOLOGY | Facility: CLINIC | Age: 62
End: 2022-10-20

## 2022-10-20 DIAGNOSIS — E55.9 VITAMIN D INSUFFICIENCY: ICD-10-CM

## 2022-10-20 DIAGNOSIS — E05.90 HYPERTHYROIDISM: Primary | ICD-10-CM

## 2022-10-20 DIAGNOSIS — M81.0 AGE-RELATED OSTEOPOROSIS WITHOUT CURRENT PATHOLOGICAL FRACTURE: ICD-10-CM

## 2022-10-20 DIAGNOSIS — E04.2 MULTIPLE THYROID NODULES: ICD-10-CM

## 2022-10-20 PROCEDURE — 99214 PR OFFICE/OUTPT VISIT, EST, LEVL IV, 30-39 MIN: ICD-10-PCS | Mod: 95,,, | Performed by: INTERNAL MEDICINE

## 2022-10-20 PROCEDURE — 99214 OFFICE O/P EST MOD 30 MIN: CPT | Mod: 95,,, | Performed by: INTERNAL MEDICINE

## 2022-10-20 NOTE — PROGRESS NOTES
Subjective:      Chief Complaint: Thyroid Nodule, Hyperthyroidism, and Osteoporosis    The patient location is: [place]  The chief complaint leading to consultation is: thyroid    Visit type: audiovisual    Face to Face time with patient: 28 minutes  33 minutes of total time spent on the encounter, which includes face to face time and non-face to face time preparing to see the patient (eg, review of tests), Obtaining and/or reviewing separately obtained history, Documenting clinical information in the electronic or other health record, Independently interpreting results (not separately reported) and communicating results to the patient/family/caregiver, or Care coordination (not separately reported).    Each patient to whom he or she provides medical services by telemedicine is:  (1) informed of the relationship between the physician and patient and the respective role of any other health care provider with respect to management of the patient; and (2) notified that he or she may decline to receive medical services by telemedicine and may withdraw from such care at any time.    Notes:     HPI: Michelle Miller is a 62 y.o. female who is having a follow-up evaluation for thyroid. Last seen 10/13/2021. Had recommended repeat labs, not done.    For the hyperthyroidism:  Dx since 2020.    Had labs:  6/2020: TSH <0.005   labs 7/6/2020:   TSH <0.005   Free T4 2.43 (high)   Total T3 268 (high)    Did have some low TSH in the past, 7/2019 and 4/2019 was too low to measure. Was prescribed levothyroxine based on those results, not taking currently.  Prior 12/2016 and 3/2015 was normal TSH.    Pt also reported having a hysterectomy 2005. Not on hormones.     Started methimazole. Pt had headache, stopped.  Started PTU, pt felt mood changes, stopped.    Also notes hx osteopenia. Toe fracture, no other broken bones. No falls.    On vitamin D 50,000 units/week    Last DXA 10/13/2022   Osteoporosis. Spine -2.9   Left fem neck -1.9    Total hip -1.9    Prior DXA 9/2020: Osteopenia. 7.5% major, 0.9% hip.  Spine -1.0, left fem neck -1.3    Last labs:  Lab Results   Component Value Date    CALCIUM 9.2 10/21/2022    ALBUMIN 3.3 (L) 10/21/2022    CREATININE 0.5 10/21/2022    [id number] 26 (L) 11/14/2021     Had US thyroid 9/2021:    Multiple thyroid nodules.    2x1.7x1.4 cm hyper/isoechoic nodule   rest smaller, <1.5 cm, not meeting TIRADs FNA criteria    FNA 11/2021: Nondiagnostic  Repeat FNA 10/2022: FLUS    Current medication:   None    Today, pt reports feeling okay overall.    No falls, fractures lately.  +weight loss.   Also dealing with tooth abscess. Treated with abx. Tooth pulled around September 18th.    Not having palpitations  No diarrhea/constipation      Reviewed past medical, family, social history and updated as appropriate.    Review of Systems  As above    Objective:   Previous vitals:  BP Readings from Last 5 Encounters:   08/29/22 (!) 165/70   03/18/21 129/77   08/27/20 136/76   08/11/20 132/72   08/04/20 (!) 140/76     Physical Exam  Constitutional:       General: she is not in acute distress.  Pulmonary:      Effort: Pulmonary effort is normal.     Wt Readings from Last 10 Encounters:   08/29/22 1705 59.4 kg (131 lb)   03/18/21 1453 73.5 kg (162 lb)   09/10/20 0916 77.1 kg (169 lb 15.6 oz)   08/27/20 1120 77.1 kg (170 lb)   08/11/20 1455 76.6 kg (168 lb 14 oz)   08/04/20 1322 78 kg (172 lb)     No results found for: HGBA1C  No results found for: CHOL, HDL, LDLCALC, TRIG, CHOLHDL  Lab Results   Component Value Date     10/21/2022    K 4.0 10/21/2022     10/21/2022    CO2 22 (L) 10/21/2022     10/21/2022    BUN 8 10/21/2022    CREATININE 0.5 10/21/2022    CALCIUM 9.2 10/21/2022    PROT 6.5 10/21/2022    ALBUMIN 3.3 (L) 10/21/2022    BILITOT 0.4 10/21/2022    ALKPHOS 121 10/21/2022    AST 12 10/21/2022    ALT 13 10/21/2022    ANIONGAP 11 10/21/2022    ESTGFRAFRICA >60.0 11/08/2021    EGFRNONAA >60.0 11/08/2021     TSH <0.010 (L) 10/21/2022        Assessment/Plan:     Hyperthyroidism  Due to Graves disease. TSI elevated.   Unable to tolerate methimazole.   Pt also had issues with PTU.  Not taking anything currently   clinically has a few potential symptoms    Recheck labs when able.  Consider medication if needed vs monitoring if still subclinical. Cholestyramine potentially could help if not able to tolerate usual meds   though did discuss with pt if treatment needed and can't do meds, surgery is also an option.      Vitamin D insufficiency  Continue vitamin D. Recheck when able.      Multiple thyroid nodules  On last US, 2 cm nodule, met BOB criteria for FNA.   FNA 11/2021 non-diagnostic.  Repeat FNA 10/2022, FLUS.   discussed with pt 5-15% chance for cancer on that result.  Reviewed options.   1. Molecular markers.   2. Surgery. Would need total thyroidectomy due to Graves disease and pt not tolerating PTU or methimazole   3. Repeat US after 6 months and re-evaluate, consider another FNA vs surgery at that time.    Patient interested in molecular markers first.   situation complicated by social determinants of health. Has financial assistance until the end of next month, so surgery/long term situation is more up in the air.   will try to determine if that testing is covered with financial assistance vs referral to surgery.    pt expressed understanding      Age-related osteoporosis without current pathological fracture  Last DXA worse.   osteoporosis.   no falls/fractures  Recommend treatment.  But recent dental extraction (9/2022).   start fosamax Mid-December 2022    Avoid falls        Follow up in about 6 months (around 4/20/2023) for further monitoring, lab review.      Nva Montgomery MD  Endocrinology

## 2022-10-20 NOTE — ASSESSMENT & PLAN NOTE
Due to Graves disease. TSI elevated.   Unable to tolerate methimazole.   Pt also had issues with PTU.  Not taking anything currently   clinically has a few potential symptoms    Recheck labs when able.  Consider medication if needed vs monitoring if still subclinical. Cholestyramine potentially could help if not able to tolerate usual meds   though did discuss with pt if treatment needed and can't do meds, surgery is also an option.

## 2022-10-20 NOTE — LETTER
October 20, 2022        Tanner Denson NP  [address] [place] [address] [place] [id number]           [facility]  [address] [address]  Phone: [phone]   Patient: Michelle Miller   MR Number: [id number]   YOB: 1960   Date of Visit: 10/20/2022     Dear Ms. Jarett:    I saw your patient, Michelle Miller, today for evaluation. Attached you will find relevant portions of my assessment and plan of care.       If you have questions, please do not hesitate to call me. I look forward to following Michelle Miller along with you.    Sincerely,      Nav Montgomery MD        CC    No Recipients    Enclosure

## 2022-10-20 NOTE — Clinical Note
Please check with the financial assistance department if the Thygenext testing from [employer] is covered by the financial assistance program. Supposedly they use CPT code 0245U. The related form is at [email or web address]

## 2022-10-20 NOTE — PATIENT INSTRUCTIONS
For thyroid:   Recheck blood test to see if still having hyperthyroidism.    For nodules:   Will see if molecular testing would be covered.   Otherwise can consider surgery vs monitoring on ultrasound.      For bones:   Continue vitamin D.   Recommend fosamax (alendronate) but need to wait until December. 3 months after tooth procedure.

## 2022-10-21 ENCOUNTER — PATIENT MESSAGE (OUTPATIENT)
Dept: ENDOCRINOLOGY | Facility: CLINIC | Age: 62
End: 2022-10-21

## 2022-10-21 ENCOUNTER — LAB VISIT (OUTPATIENT)
Dept: LAB | Facility: HOSPITAL | Age: 62
End: 2022-10-21
Attending: INTERNAL MEDICINE

## 2022-10-21 DIAGNOSIS — E05.90 HYPERTHYROIDISM: ICD-10-CM

## 2022-10-21 DIAGNOSIS — M81.0 AGE-RELATED OSTEOPOROSIS WITHOUT CURRENT PATHOLOGICAL FRACTURE: ICD-10-CM

## 2022-10-21 DIAGNOSIS — E05.90 HYPERTHYROIDISM: Primary | ICD-10-CM

## 2022-10-21 LAB
25(OH)D3+25(OH)D2 SERPL-MCNC: 26 NG/ML (ref 30–96)
ALBUMIN SERPL BCP-MCNC: 3.3 G/DL (ref 3.5–5.2)
ALP SERPL-CCNC: 121 U/L (ref 55–135)
ALT SERPL W/O P-5'-P-CCNC: 13 U/L (ref 10–44)
ANION GAP SERPL CALC-SCNC: 11 MMOL/L (ref 8–16)
AST SERPL-CCNC: 12 U/L (ref 10–40)
BASOPHILS # BLD AUTO: 0.02 K/UL (ref 0–0.2)
BASOPHILS NFR BLD: 0.4 % (ref 0–1.9)
BILIRUB SERPL-MCNC: 0.4 MG/DL (ref 0.1–1)
BUN SERPL-MCNC: 8 MG/DL (ref 8–23)
CALCIUM SERPL-MCNC: 9.2 MG/DL (ref 8.7–10.5)
CHLORIDE SERPL-SCNC: 104 MMOL/L (ref 95–110)
CO2 SERPL-SCNC: 22 MMOL/L (ref 23–29)
CREAT SERPL-MCNC: 0.5 MG/DL (ref 0.5–1.4)
DIFFERENTIAL METHOD: ABNORMAL
EOSINOPHIL # BLD AUTO: 0.1 K/UL (ref 0–0.5)
EOSINOPHIL NFR BLD: 1.9 % (ref 0–8)
ERYTHROCYTE [DISTWIDTH] IN BLOOD BY AUTOMATED COUNT: 12.9 % (ref 11.5–14.5)
EST. GFR  (NO RACE VARIABLE): >60 ML/MIN/1.73 M^2
GLUCOSE SERPL-MCNC: 102 MG/DL (ref 70–110)
HCT VFR BLD AUTO: 30.9 % (ref 37–48.5)
HGB BLD-MCNC: 10.3 G/DL (ref 12–16)
IMM GRANULOCYTES # BLD AUTO: 0.02 K/UL (ref 0–0.04)
IMM GRANULOCYTES NFR BLD AUTO: 0.4 % (ref 0–0.5)
LYMPHOCYTES # BLD AUTO: 2 K/UL (ref 1–4.8)
LYMPHOCYTES NFR BLD: 37.5 % (ref 18–48)
MCH RBC QN AUTO: 28.1 PG (ref 27–31)
MCHC RBC AUTO-ENTMCNC: 33.3 G/DL (ref 32–36)
MCV RBC AUTO: 84 FL (ref 82–98)
MONOCYTES # BLD AUTO: 0.4 K/UL (ref 0.3–1)
MONOCYTES NFR BLD: 7.2 % (ref 4–15)
NEUTROPHILS # BLD AUTO: 2.8 K/UL (ref 1.8–7.7)
NEUTROPHILS NFR BLD: 52.6 % (ref 38–73)
NRBC BLD-RTO: 0 /100 WBC
PLATELET # BLD AUTO: 250 K/UL (ref 150–450)
PMV BLD AUTO: 8.8 FL (ref 9.2–12.9)
POTASSIUM SERPL-SCNC: 4 MMOL/L (ref 3.5–5.1)
PROT SERPL-MCNC: 6.5 G/DL (ref 6–8.4)
RBC # BLD AUTO: 3.67 M/UL (ref 4–5.4)
SODIUM SERPL-SCNC: 137 MMOL/L (ref 136–145)
T3 SERPL-MCNC: 421 NG/DL (ref 60–180)
T4 FREE SERPL-MCNC: 2.71 NG/DL (ref 0.71–1.51)
TSH SERPL DL<=0.005 MIU/L-ACNC: <0.01 UIU/ML (ref 0.4–4)
WBC # BLD AUTO: 5.31 K/UL (ref 3.9–12.7)

## 2022-10-21 PROCEDURE — 80053 COMPREHEN METABOLIC PANEL: CPT | Performed by: INTERNAL MEDICINE

## 2022-10-21 PROCEDURE — 85025 COMPLETE CBC W/AUTO DIFF WBC: CPT | Performed by: INTERNAL MEDICINE

## 2022-10-21 PROCEDURE — 84443 ASSAY THYROID STIM HORMONE: CPT | Performed by: INTERNAL MEDICINE

## 2022-10-21 PROCEDURE — 36415 COLL VENOUS BLD VENIPUNCTURE: CPT | Performed by: INTERNAL MEDICINE

## 2022-10-21 PROCEDURE — 82306 VITAMIN D 25 HYDROXY: CPT | Performed by: INTERNAL MEDICINE

## 2022-10-21 PROCEDURE — 84480 ASSAY TRIIODOTHYRONINE (T3): CPT | Performed by: INTERNAL MEDICINE

## 2022-10-21 PROCEDURE — 84439 ASSAY OF FREE THYROXINE: CPT | Performed by: INTERNAL MEDICINE

## 2022-10-21 NOTE — ASSESSMENT & PLAN NOTE
Last DXA worse.   osteoporosis.   no falls/fractures  Recommend treatment.  But recent dental extraction (9/2022).   start fosamax Mid-December 2022    Avoid falls

## 2022-10-21 NOTE — ASSESSMENT & PLAN NOTE
On last US, 2 cm nodule, met BOB criteria for FNA.   FNA 11/2021 non-diagnostic.  Repeat FNA 10/2022, FLUS.   discussed with pt 5-15% chance for cancer on that result.  Reviewed options.   1. Molecular markers.   2. Surgery. Would need total thyroidectomy due to Graves disease and pt not tolerating PTU or methimazole   3. Repeat US after 6 months and re-evaluate, consider another FNA vs surgery at that time.    Patient interested in molecular markers first.   situation complicated by social determinants of health. Has financial assistance until the end of next month, so surgery/long term situation is more up in the air.   will try to determine if that testing is covered with financial assistance vs referral to surgery.    pt expressed understanding

## 2022-10-23 ENCOUNTER — PATIENT MESSAGE (OUTPATIENT)
Dept: ENDOCRINOLOGY | Facility: CLINIC | Age: 62
End: 2022-10-23

## 2022-10-24 NOTE — TELEPHONE ENCOUNTER
Pt sent 4 messages about her labs.    Lab Results   Component Value Date    TSH <0.010 (L) 10/21/2022    G8PKCBY 421 (H) 10/21/2022    FREET4 2.71 (H) 10/21/2022     TSH low, T4 and T3 high. Would benefit from medication.    Methimazole vs PTU. Pt already had issues with both, stopped taking.    Vit D low. Should take OTC vitamin D.    CO2 22 instead of 23 is okay.    Low hemoglobin is anemia.    Will see if pt up for med. Otherwise surgery.   Can use SSKI and beta blockers perioperatively as needed if unable to tolerate usual meds, though likely would try cholestyramine as well.

## 2022-10-26 NOTE — TELEPHONE ENCOUNTER
Financial assistance reported the molecular testing is covered.  Filled out, will try to get that sent off.

## 2022-10-28 ENCOUNTER — PATIENT MESSAGE (OUTPATIENT)
Dept: ENDOCRINOLOGY | Facility: CLINIC | Age: 62
End: 2022-10-28

## 2022-10-28 DIAGNOSIS — E05.90 HYPERTHYROIDISM: Primary | ICD-10-CM

## 2022-10-28 RX ORDER — CHOLESTYRAMINE 4 G/9G
4 POWDER, FOR SUSPENSION ORAL
Qty: 90 PACKET | Refills: 11 | Status: SHIPPED | OUTPATIENT
Start: 2022-10-28 | End: 2022-10-28

## 2022-10-28 RX ORDER — CHOLESTYRAMINE 4 G/9G
4 POWDER, FOR SUSPENSION ORAL
Qty: 368.76 G | Refills: 11 | Status: SHIPPED | OUTPATIENT
Start: 2022-10-28 | End: 2022-11-03

## 2022-11-03 ENCOUNTER — HOSPITAL ENCOUNTER (OUTPATIENT)
Dept: RADIOLOGY | Facility: HOSPITAL | Age: 62
Discharge: HOME | End: 2022-11-03

## 2022-11-03 ENCOUNTER — OFFICE VISIT (OUTPATIENT)
Dept: PAIN MEDICINE | Facility: CLINIC | Age: 62
End: 2022-11-03

## 2022-11-03 VITALS — HEIGHT: 65 IN | WEIGHT: 126 LBS | RESPIRATION RATE: 18 BRPM | BODY MASS INDEX: 20.99 KG/M2

## 2022-11-03 DIAGNOSIS — G89.29 MID BACK PAIN, CHRONIC: ICD-10-CM

## 2022-11-03 DIAGNOSIS — M51.36 DEGENERATIVE DISC DISEASE, LUMBAR: ICD-10-CM

## 2022-11-03 DIAGNOSIS — M51.34 DDD (DEGENERATIVE DISC DISEASE), THORACIC: Primary | ICD-10-CM

## 2022-11-03 DIAGNOSIS — M54.9 MID BACK PAIN, CHRONIC: ICD-10-CM

## 2022-11-03 DIAGNOSIS — M51.34 DDD (DEGENERATIVE DISC DISEASE), THORACIC: ICD-10-CM

## 2022-11-03 PROCEDURE — 72070 X-RAY EXAM THORAC SPINE 2VWS: CPT | Mod: TC

## 2022-11-03 PROCEDURE — 99999 PR PBB SHADOW E&M-EST. PATIENT-LVL IV: CPT | Mod: PBBFAC,,,

## 2022-11-03 PROCEDURE — 72070 XR THORACIC SPINE AP LATERAL: ICD-10-PCS | Mod: 26,,, | Performed by: RADIOLOGY

## 2022-11-03 PROCEDURE — 99214 OFFICE O/P EST MOD 30 MIN: CPT | Mod: PBBFAC,PO

## 2022-11-03 PROCEDURE — 72070 X-RAY EXAM THORAC SPINE 2VWS: CPT | Mod: 26,,, | Performed by: RADIOLOGY

## 2022-11-03 PROCEDURE — 99214 PR OFFICE/OUTPT VISIT, EST, LEVL IV, 30-39 MIN: ICD-10-PCS | Mod: S$PBB,,,

## 2022-11-03 PROCEDURE — 99999 PR PBB SHADOW E&M-EST. PATIENT-LVL IV: ICD-10-PCS | Mod: PBBFAC,,,

## 2022-11-03 PROCEDURE — 99214 OFFICE O/P EST MOD 30 MIN: CPT | Mod: S$PBB,,,

## 2022-11-03 RX ORDER — ALBUTEROL SULFATE 90 UG/1
AEROSOL, METERED RESPIRATORY (INHALATION)
COMMUNITY
Start: 2022-05-16

## 2022-11-03 RX ORDER — PROPYLTHIOURACIL 50 MG/1
TABLET ORAL
COMMUNITY
End: 2022-12-02 | Stop reason: SDUPTHER

## 2022-11-03 RX ORDER — HYDROCODONE BITARTRATE AND ACETAMINOPHEN 10; 325 MG/1; MG/1
1 TABLET ORAL EVERY 6 HOURS PRN
COMMUNITY
Start: 2022-09-01

## 2022-11-03 RX ORDER — PROMETHAZINE HYDROCHLORIDE 25 MG/1
25 TABLET ORAL
COMMUNITY
Start: 2022-09-01

## 2022-11-03 NOTE — H&P (VIEW-ONLY)
Referring Physician: No ref. provider found    PCP: Tanner Denson NP    CC: low back pain and left hip pain    HPI:   Michelle Miller is a 62 y.o. female with PMH significant for COPD and active tobacco use presents as a referral for the evaluation of low back and left hip pain. The patient reports that her pain began approximately in 1/2018 after no inciting incident or trauma. The patient localizes her pain at the level of her bra strap and lower (L>R). The patient reports of intermittent left hip pain. The patient also reports that her pain can radiate down her LLE to her ankle when she sits for prolonged periods of time. The patient reports of intermittent numbness and tingling in her LLE when she drives for prolonged periods of time. The patient describes her pain as a sharp type of pain. The patient reports that her current pain 3-4/10. Patient denies of any urinary/fecal incontinence, saddle anesthesia, or weakness.     Of note, the patient does report of left sided hip pain which was not present when she was being treated by Dr. Mares.     Aggravating factors: walking, laying on her left side, prolonged sitting, bending    Mitigating factors: loratab as needed (but requires phenergan for nausea)     Relevant Surgeries: no    Interventional Therapies: yes; Dr. Mares  12/21/2018: Left L4-L5 transforaminal HILARIO - 1 year of benefit     INTERVAL HPI:   Michelle Miller is a 62 y.o. female with PMH significant for COPD and active tobacco use presents as an establishes patient, new to me, for the evaluation of low back and left hip pain.  The patient is s/p Lumbar HILARIO at L4- L5 on 8/7/2020 and reports excellent relief for at least a year. Today, the patient reports intermittent pain to the area across her lower back with radiation up at the level of her bra strap and lower (L>R). The patient reports of intermittent left hip pain. The patient also reports that her pain can radiate down her LLE to her ankle when she sits for  "prolonged periods of time. The patient reports of intermittent numbness and tingling in her LLE when she drives for prolonged periods of time. The patient describes her pain as a "grinding" type of pain. The patient reports that her pain is exacerbated with repetitive bending forwards that is required for her work.  The patient also reports the pain is similar to the pain she had prior to her Lumbar HILARIO in 2020 and is requesting to repeat the procedure. The patient reports that her pain  The patient reports that her current pain 0/10. The patient reports that her pain can increase to 10/10.  The patient denies of any significant changes in her health since her last appointment. The patient also denies of any changes in the character of her pain since her last appointment.  Patient denies of any urinary/fecal incontinence, saddle anesthesia, or weakness.     INTERVENTIONAL THERAPIES:   8/27/2020: Lumbar interlaminar epidural steroid injection at L4-L5 under fluoroscopic guidance (left paramedian approach)- Dr. Russell- excellent relief for over a year.    8/04/2020: Left GTB injection- Dr. Russell- excellent relief.        : Not applicable      Non-pharmacologic Treatment:     Physical Therapy: yes; last did in 2017; no benefit   Ice/Heat: yes; alternates heat and ice without much benefit   TENS: no  Massage: yes; tried at the chiropractor with no benefit   Chiropractic care: yes; no benefit   Acupuncture: no         Pain Medications:         Currently taking: ibuprofen (some benefit)    Has tried in the past:    Opioids: yes  NSAIDS: yes; meloxicam - could not tolerate side effects  Tylenol: yes; some benefit   Muscle relaxants: yes; cannot tolerate side effects   TCAs: yes; stopped elavil secondary to no benefit   SNRIs: no  Anticonvulsants: no  topical creams: yes; OTC creams with no benefit   Other: prednisone PRN is helpful for back pain    Anticoagulation: no    ROS:  Review of Systems   Constitutional:  Negative " "for chills and fever.   HENT:  Negative for sore throat.    Eyes:  Negative for visual disturbance.   Respiratory:  Negative for shortness of breath.    Cardiovascular:  Negative for chest pain.   Gastrointestinal:  Negative for nausea and vomiting.   Genitourinary:  Negative for difficulty urinating.   Musculoskeletal:  Positive for arthralgias and back pain.   Skin:  Negative for rash.   Allergic/Immunologic: Negative for immunocompromised state.   Neurological:  Negative for syncope.   Hematological:  Does not bruise/bleed easily.   Psychiatric/Behavioral:  Negative for suicidal ideas.       MEDICAL, SURGICAL, FAMILY, SOCIAL HX: reviewed    MEDICATIONS/ALLERGIES: reviewed         Allergies: See med card    Vitals:    11/03/22 0828   Resp: 18   Weight: 57.2 kg (126 lb)   Height: 5' 5" (1.651 m)   PainSc: 0-No pain           Physical exam:  Physical Exam  Vitals and nursing note reviewed.   Constitutional:       Appearance: She is not diaphoretic.   HENT:      Head: Normocephalic and atraumatic.   Eyes:      General:         Right eye: No discharge.         Left eye: No discharge.      Conjunctiva/sclera: Conjunctivae normal.   Cardiovascular:      Rate and Rhythm: Normal rate.   Pulmonary:      Effort: Pulmonary effort is normal. No respiratory distress.      Breath sounds: Normal breath sounds.   Abdominal:      Palpations: Abdomen is soft.   Skin:     General: Skin is warm and dry.      Findings: No rash.   Neurological:      Mental Status: She is alert and oriented to person, place, and time.   Psychiatric:         Mood and Affect: Mood and affect normal.         Cognition and Memory: Memory normal.         Judgment: Judgment normal.        UPPER EXTREMITIES: Normal alignment, normal range of motion, no atrophy, no skin changes,  hair growth and nail growth normal and equal bilaterally. No swelling, no tenderness.    LOWER EXTREMITIES:  Normal alignment, normal range of motion, no atrophy, no skin changes,  " hair growth and nail growth normal and equal bilaterally. No swelling, no tenderness.    LUMBAR SPINE  Lumbar spine: ROM is limited with flexion extension and oblique extension with increased pain with extension.     ((--)) Supine straight leg raise    ((+)) Facet loading   Internal and external rotation of the hip causes increased pain bilaterally.    Myofascial exam: Tenderness to palpation across lumbar paraspinous muscles.    ((--)) TTP at the SI joint  ((+)) ARGELIA's test (positive for hip pain bilaterally)  ((--)) One leg stand    ((--)) Distraction test    CRANIAL NERVES:  II:  PERRL bilaterally,   III,IV,VI: EOMI.    V:  Facial sensation equal bilaterally  VII:  Facial motor function normal.  VIII:  Hearing equal to finger rub bilaterally  IX/X: Gag normal, palate symmetric  XI:  Shoulder shrug equal, head turn equal  XII:  Tongue midline without fasciculations      MOTOR: Tone and bulk: normal bilateral upper and lower Strength: normal   Delt Bi Tri WE WF     R 5 5 5 5 5 5   L 5 5 5 5 5 5     IP ADD ABD Quad TA Gas HAM  R 5 5 5 5 5 5 5  L 5 5 5 5 5 5 5    SENSATION: Light touch and pinprick intact bilaterally  REFLEXES: normal, symmetric, nonbrisk.  Toes down, no clonus. Negative zarate's sign bilaterally.  GAIT: normal rise, base, steps, and arm swing.        Imaging:          MRI Lumbar spine without contrast (7/10/2020):  Vertebral body height and alignment are anatomic.  Marrow signal is within normal limits.  The conus terminates at L1.  Disc heights are well maintained.  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     L1-2: There is no disc herniation.  There is mild bilateral neuroforaminal narrowing predominately due to short pedicles.     L2-3: There is no disc herniation.  Mild spinal canal narrowing and mild bilateral neuroforaminal narrowing are present due to short pedicles and mild facet arthropathy.     L3-4: There is moderate bilateral neuroforaminal narrowing due to mild disc bulging,  "short pedicles, and mild facet arthropathy.  Mild spinal canal narrowing is also present.     L4-5: There is moderate bilateral neuroforaminal narrowing related to mild disc bulging, short pedicles, and mild facet arthropathy.  There is also mild spinal canal narrowing.     L5-S1: No disc herniation, spinal canal narrowing, or neuroforaminal narrowing.     There is a 2.8 cm fusiform aneurysm of the infrarenal aorta.     Impression:     Mild spinal canal narrowing and neuroforaminal narrowing ranging from mild to moderate, with congenitally short pedicles contributory.     Small infrarenal abdominal aortic aneurysm.    Assessment: Michelle Miller is a 60 y.o. female with PMH significant for COPD and active tobacco use presents as an establishes patient, new to me,  for the evaluation of low back and left hip pain. The patient presents today localizing the worse of her pain to the area across her lower back with intermittent radiation up her back to the level of her bra strap. The patient describes her pain as "grinding" in character.  Physical examination significant for reproducible pain with facet loading and extension of the lumbar spine. The patient reports her pain is similar to the pain she had prior to her Lumbar HILARIO in 2020.  MRI significant for DDD and facet arthropathy. Treatment plan outlined below.     Plan:  - Schedule for repeat L4-L5 lumbar interlaminar epidural steroid injection- possible no sedation, patient may not have ride home.    - Ordered Xray of Thoracic Spine.   - I have stressed the importance of physical activity and a home exercise plan to help with chronic pain and improve health.  - RTC for the procedure as outlined above   Yessenia Aleman NP       "

## 2022-11-07 ENCOUNTER — HOSPITAL ENCOUNTER (OUTPATIENT)
Dept: RADIOLOGY | Facility: HOSPITAL | Age: 62
Discharge: HOME | End: 2022-11-07
Attending: ANESTHESIOLOGY

## 2022-11-07 ENCOUNTER — PATIENT MESSAGE (OUTPATIENT)
Dept: SURGERY | Facility: HOSPITAL | Age: 62
End: 2022-11-07

## 2022-11-07 DIAGNOSIS — M25.559 HIP PAIN: Primary | ICD-10-CM

## 2022-11-07 DIAGNOSIS — M25.559 HIP PAIN: ICD-10-CM

## 2022-11-07 PROCEDURE — 73521 XR HIPS BILATERAL 2 VIEW INCL AP PELVIS: ICD-10-PCS | Mod: 26,,, | Performed by: RADIOLOGY

## 2022-11-07 PROCEDURE — 73521 X-RAY EXAM HIPS BI 2 VIEWS: CPT | Mod: TC

## 2022-11-07 PROCEDURE — 73521 X-RAY EXAM HIPS BI 2 VIEWS: CPT | Mod: 26,,, | Performed by: RADIOLOGY

## 2022-11-09 ENCOUNTER — HOSPITAL ENCOUNTER (OUTPATIENT)
Facility: HOSPITAL | Age: 62
Discharge: HOME | End: 2022-11-09
Attending: ANESTHESIOLOGY | Admitting: ANESTHESIOLOGY

## 2022-11-09 VITALS
OXYGEN SATURATION: 98 % | HEART RATE: 93 BPM | HEIGHT: 65 IN | WEIGHT: 126 LBS | RESPIRATION RATE: 16 BRPM | DIASTOLIC BLOOD PRESSURE: 65 MMHG | TEMPERATURE: 98 F | SYSTOLIC BLOOD PRESSURE: 154 MMHG | BODY MASS INDEX: 20.99 KG/M2

## 2022-11-09 DIAGNOSIS — M51.36 DEGENERATIVE DISC DISEASE, LUMBAR: Primary | ICD-10-CM

## 2022-11-09 PROCEDURE — 25000003 PHARM REV CODE 250: Performed by: ANESTHESIOLOGY

## 2022-11-09 PROCEDURE — 62323 PR INJ LUMBAR/SACRAL, W/IMAGING GUIDANCE: ICD-10-PCS | Mod: ,,, | Performed by: ANESTHESIOLOGY

## 2022-11-09 PROCEDURE — 62323 NJX INTERLAMINAR LMBR/SAC: CPT | Mod: ,,, | Performed by: ANESTHESIOLOGY

## 2022-11-09 PROCEDURE — 25500020 PHARM REV CODE 255: Performed by: ANESTHESIOLOGY

## 2022-11-09 PROCEDURE — 63600175 PHARM REV CODE 636 W HCPCS: Performed by: ANESTHESIOLOGY

## 2022-11-09 PROCEDURE — 62323 NJX INTERLAMINAR LMBR/SAC: CPT | Performed by: ANESTHESIOLOGY

## 2022-11-09 RX ORDER — METHYLPREDNISOLONE ACETATE 80 MG/ML
INJECTION, SUSPENSION INTRA-ARTICULAR; INTRALESIONAL; INTRAMUSCULAR; SOFT TISSUE
Status: DISCONTINUED | OUTPATIENT
Start: 2022-11-09 | End: 2022-11-09 | Stop reason: HOSPADM

## 2022-11-09 RX ORDER — SODIUM CHLORIDE, SODIUM LACTATE, POTASSIUM CHLORIDE, CALCIUM CHLORIDE 600; 310; 30; 20 MG/100ML; MG/100ML; MG/100ML; MG/100ML
INJECTION, SOLUTION INTRAVENOUS ONCE AS NEEDED
Status: ACTIVE | OUTPATIENT
Start: 2022-11-09 | End: 2034-04-07

## 2022-11-09 RX ORDER — BUPIVACAINE HYDROCHLORIDE 2.5 MG/ML
INJECTION, SOLUTION EPIDURAL; INFILTRATION; INTRACAUDAL
Status: DISCONTINUED | OUTPATIENT
Start: 2022-11-09 | End: 2022-11-09 | Stop reason: HOSPADM

## 2022-11-09 RX ORDER — LIDOCAINE HYDROCHLORIDE 10 MG/ML
INJECTION INFILTRATION; PERINEURAL
Status: DISCONTINUED | OUTPATIENT
Start: 2022-11-09 | End: 2022-11-09 | Stop reason: HOSPADM

## 2022-11-09 NOTE — OP NOTE
PROCEDURE DATE: 11/9/2022    PROCEDURE: Lumbar interlaminar epidural steroid injection at L4-L5 under fluoroscopic guidance (left paramedian approach)    DIAGNOSIS: Lumbar radiculopathy  POSTOP DIAGNOSIS: SAME    PHYSICIAN: Luan Russell M.D.    MEDICATIONS INJECTED: 80 mg depo-medrol with 3 ml of preservative free NaCl and 1 ml of 0.25% bupivacaine    LOCAL ANESTHETIC INJECTED:    Lidocaine 1% 4 ml total    SEDATION MEDICATIONS: N/A    ESTIMATED BLOOD LOSS:  none    COMPLICATIONS:  none    TECHNIQUE:  Time-out taken to identify patient and procedure prior to starting the procedure.  With the patient laying in a prone position, the area was prepped and draped in the usual sterile fashion using ChloraPrep and a fenestrated drape.  After determining the target level with an AP fluoroscopic view, local anesthetic was given using a 25-gauge 1.5 inch needle by raising a wheal and then infiltrating toward the interlaminar entry space.  A 3.5 inch 20-gauge Touhy needle was introduced under AP fluoroscopic guidance to the interlaminar space of L4-L5. Once the trajectory was established, the needle was visualized in the lateral view and advanced using loss of resistance technique. Once in the desired position, 2 mL contrast was injected to confirm placement and there was no vascular uptake nor intrathecal spread.  The medication was then injected slowly. The patient tolerated the procedure well.      The patient was monitored after the procedure.   They were given post-procedure and discharge instructions to follow at home.  The patient was discharged in a stable condition.

## 2022-11-09 NOTE — PLAN OF CARE
Local procedure. Leaving floor per w/c with RN. FRANDY x3. Resp even and unlabored room air. No distress noted. Tolerating Po fluids without c/o nausea/vomiting. All personal belongings returned to pt.

## 2022-11-09 NOTE — DISCHARGE SUMMARY
[facility] Surgery  Discharge Note  Short Stay    Procedure(s) (LRB):  Injection, Steroid, Epidural LESI L4-L5 (N/A)      OUTCOME: Patient tolerated treatment/procedure well without complication and is now ready for discharge.    DISPOSITION: Home or Self Care    FINAL DIAGNOSIS:  Degenerative disc disease, lumbar    FOLLOWUP: In clinic    DISCHARGE INSTRUCTIONS:    Discharge Procedure Orders   Diet general     Call MD for:  temperature >100.4     Call MD for:  persistent nausea and vomiting     Call MD for:  severe uncontrolled pain     Call MD for:  difficulty breathing, headache or visual disturbances     Call MD for:  redness, tenderness, or signs of infection (pain, swelling, redness, odor or green/yellow discharge around incision site)     Call MD for:  hives     Call MD for:  persistent dizziness or light-headedness     Call MD for:  extreme fatigue        TIME SPENT ON DISCHARGE: 15 minutes

## 2022-11-09 NOTE — PLAN OF CARE
Pt arrived to Preop. Stated she will be receiving local, no IV sedation. Also states she has had coffee recently.

## 2022-11-15 ENCOUNTER — PATIENT MESSAGE (OUTPATIENT)
Dept: ENDOCRINOLOGY | Facility: CLINIC | Age: 62
End: 2022-11-15

## 2022-11-17 ENCOUNTER — PATIENT MESSAGE (OUTPATIENT)
Dept: ENDOCRINOLOGY | Facility: CLINIC | Age: 62
End: 2022-11-17

## 2022-11-28 ENCOUNTER — PATIENT MESSAGE (OUTPATIENT)
Dept: ENDOCRINOLOGY | Facility: CLINIC | Age: 62
End: 2022-11-28

## 2022-11-28 ENCOUNTER — LAB VISIT (OUTPATIENT)
Dept: LAB | Facility: HOSPITAL | Age: 62
End: 2022-11-28
Attending: INTERNAL MEDICINE

## 2022-11-28 DIAGNOSIS — E05.90 HYPERTHYROIDISM: ICD-10-CM

## 2022-11-28 LAB
T4 FREE SERPL-MCNC: 1.59 NG/DL (ref 0.71–1.51)
TSH SERPL DL<=0.005 MIU/L-ACNC: <0.01 UIU/ML (ref 0.4–4)

## 2022-11-28 PROCEDURE — 84439 ASSAY OF FREE THYROXINE: CPT | Performed by: INTERNAL MEDICINE

## 2022-11-28 PROCEDURE — 36415 COLL VENOUS BLD VENIPUNCTURE: CPT | Performed by: INTERNAL MEDICINE

## 2022-11-28 PROCEDURE — 84443 ASSAY THYROID STIM HORMONE: CPT | Performed by: INTERNAL MEDICINE

## 2022-11-29 ENCOUNTER — PATIENT MESSAGE (OUTPATIENT)
Dept: ENDOCRINOLOGY | Facility: CLINIC | Age: 62
End: 2022-11-29

## 2022-11-29 DIAGNOSIS — E05.90 HYPERTHYROIDISM: Primary | ICD-10-CM

## 2022-12-02 RX ORDER — PROPYLTHIOURACIL 50 MG/1
100 TABLET ORAL 3 TIMES DAILY
Qty: 180 TABLET | Refills: 5 | Status: SHIPPED | OUTPATIENT
Start: 2022-12-02 | End: 2023-05-31

## 2022-12-02 RX ORDER — PROPRANOLOL HYDROCHLORIDE 20 MG/1
20 TABLET ORAL 2 TIMES DAILY
Qty: 60 TABLET | Refills: 3 | Status: SHIPPED | OUTPATIENT
Start: 2022-12-02 | End: 2023-12-02

## 2022-12-02 NOTE — TELEPHONE ENCOUNTER
Molecular markers back.    ThyraMIRv2 negative.  ThyGeNEXT GNAS+. GNAS in benign nodules, but also sometimes in cancer. Unclear significance of that finding.    Consider repeat FNA vs surgery.    Leaning towards surgery, but complicated by pt time off from work situation, other factors.

## 2023-01-01 NOTE — ASSESSMENT & PLAN NOTE
Differential diagnosis includes Graves disease, MNG/toxic nodule, thyroiditis.   - less likely transient thyroiditis given low TSH last year as well. Was prescribed levothyroxine for a low TSH, encourage pt not to take that. Hasn't been taking it for the last year or so.   - clinically hyperthyroid   - will start medication. Methimazole 20 mg/day. Discussed with pt potential for remission after 1.5 years of therapy   - recheck labs in 6 weeks along with antibodies   consider NM uptake and scan if antibodies negative  Cause-specific treatment options and associated risks discussed with patient.    - Reviewed possible long-term options including anti-thyroid drugs (like methimazole), I-131 therapy, and surgery.  Handout provided. All questions were answered.    
Had low vit D several times   - was on treatment   - lately not really taking anything   - recheck with next labs, may need to take more vitamin D   - ideally at least 1,000 units/day for now    
Pt reports hx osteopenia   - now also hyperthyroid. Ensure euthyroid as above   - will check BMD in a month or two if possible   - encourage pt to avoid falls    
Statement Selected

## 2023-08-17 ENCOUNTER — HOSPITAL ENCOUNTER (OUTPATIENT)
Dept: RADIOLOGY | Facility: HOSPITAL | Age: 63
Discharge: HOME | End: 2023-08-17
Attending: NURSE PRACTITIONER

## 2023-08-17 DIAGNOSIS — U07.1 COVID-19: ICD-10-CM

## 2023-08-17 PROCEDURE — 71046 XR CHEST PA AND LATERAL: ICD-10-PCS | Mod: 26,,, | Performed by: RADIOLOGY

## 2023-08-17 PROCEDURE — 71046 X-RAY EXAM CHEST 2 VIEWS: CPT | Mod: 26,,, | Performed by: RADIOLOGY

## 2023-08-17 PROCEDURE — 71046 X-RAY EXAM CHEST 2 VIEWS: CPT | Mod: TC

## 2025-04-16 DIAGNOSIS — E04.1 THYROID NODULE: ICD-10-CM

## 2025-04-16 DIAGNOSIS — E05.20 THYROTOXICOSIS WITH TOXIC MULTINODULAR GOITER WITHOUT THYROTOXIC CRISIS OR STORM: Primary | ICD-10-CM

## 2025-05-02 ENCOUNTER — HOSPITAL ENCOUNTER (OUTPATIENT)
Dept: RADIOLOGY | Facility: HOSPITAL | Age: 65
Discharge: HOME | End: 2025-05-02
Attending: NURSE PRACTITIONER
Payer: MEDICARE

## 2025-05-02 DIAGNOSIS — Z12.31 ENCOUNTER FOR SCREENING MAMMOGRAM FOR BREAST CANCER: ICD-10-CM

## 2025-05-02 PROCEDURE — 77067 SCR MAMMO BI INCL CAD: CPT | Mod: 26,,, | Performed by: RADIOLOGY

## 2025-05-02 PROCEDURE — 77063 BREAST TOMOSYNTHESIS BI: CPT | Mod: TC

## 2025-05-02 PROCEDURE — 77063 BREAST TOMOSYNTHESIS BI: CPT | Mod: 26,,, | Performed by: RADIOLOGY

## 2025-05-16 ENCOUNTER — HOSPITAL ENCOUNTER (OUTPATIENT)
Dept: RADIOLOGY | Facility: HOSPITAL | Age: 65
Discharge: HOME | End: 2025-05-16
Attending: NURSE PRACTITIONER
Payer: MEDICARE

## 2025-05-16 DIAGNOSIS — I71.40 ABDOMINAL AORTIC ANEURYSM WITHOUT RUPTURE: ICD-10-CM

## 2025-05-16 DIAGNOSIS — Z72.0 TOBACCO ABUSE: ICD-10-CM

## 2025-05-16 PROCEDURE — 71271 CT THORAX LUNG CANCER SCR C-: CPT | Mod: TC

## 2025-05-16 PROCEDURE — 76706 US ABDL AORTA SCREEN AAA: CPT | Mod: TC

## 2025-05-16 PROCEDURE — 76706 US ABDL AORTA SCREEN AAA: CPT | Mod: 26,,, | Performed by: RADIOLOGY

## 2025-05-16 PROCEDURE — 71271 CT THORAX LUNG CANCER SCR C-: CPT | Mod: 26,,, | Performed by: RADIOLOGY

## (undated) DEVICE — GLOVE SURG ULTRA TOUCH 7.5

## (undated) DEVICE — APPLICATOR CHLORAPREP CLR 10.5

## (undated) DEVICE — MARKER SKIN STND TIP BLUE BARR

## (undated) DEVICE — SYR LUER SLIP GLASS 5ML

## (undated) DEVICE — TRAY NERVE BLOCK

## (undated) DEVICE — KIT NERVE BLOCK PREP BAPTIST

## (undated) DEVICE — STRAP OR TABLE 5IN X 72IN

## (undated) DEVICE — PAD PREPS ALCOHOL 2-PLY LARGE

## (undated) DEVICE — NDL SAFETY 25G X 1.5 ECLIPSE

## (undated) DEVICE — NDL TUOHY EPIDRL YLW 20G 3.5IN